# Patient Record
Sex: MALE | Race: BLACK OR AFRICAN AMERICAN | NOT HISPANIC OR LATINO | Employment: UNEMPLOYED | URBAN - METROPOLITAN AREA
[De-identification: names, ages, dates, MRNs, and addresses within clinical notes are randomized per-mention and may not be internally consistent; named-entity substitution may affect disease eponyms.]

---

## 2018-08-13 ENCOUNTER — HOSPITAL ENCOUNTER (OUTPATIENT)
Facility: MEDICAL CENTER | Age: 68
End: 2018-08-13
Attending: FAMILY MEDICINE
Payer: MEDICARE

## 2018-08-13 ENCOUNTER — OFFICE VISIT (OUTPATIENT)
Dept: MEDICAL GROUP | Facility: PHYSICIAN GROUP | Age: 68
End: 2018-08-13
Payer: MEDICARE

## 2018-08-13 VITALS
HEIGHT: 68 IN | TEMPERATURE: 98 F | WEIGHT: 206.9 LBS | DIASTOLIC BLOOD PRESSURE: 100 MMHG | SYSTOLIC BLOOD PRESSURE: 162 MMHG | OXYGEN SATURATION: 98 % | RESPIRATION RATE: 12 BRPM | BODY MASS INDEX: 31.36 KG/M2 | HEART RATE: 80 BPM

## 2018-08-13 DIAGNOSIS — R25.1 TREMOR OF RIGHT HAND: ICD-10-CM

## 2018-08-13 DIAGNOSIS — D51.8 OTHER VITAMIN B12 DEFICIENCY ANEMIA: ICD-10-CM

## 2018-08-13 DIAGNOSIS — E11.9 TYPE 2 DIABETES MELLITUS WITHOUT COMPLICATION, WITHOUT LONG-TERM CURRENT USE OF INSULIN (HCC): ICD-10-CM

## 2018-08-13 DIAGNOSIS — Z23 IMMUNIZATION DUE: ICD-10-CM

## 2018-08-13 DIAGNOSIS — Z12.11 SCREENING FOR COLON CANCER: ICD-10-CM

## 2018-08-13 DIAGNOSIS — R35.1 NOCTURIA: ICD-10-CM

## 2018-08-13 DIAGNOSIS — Z12.5 ENCOUNTER FOR SCREENING FOR MALIGNANT NEOPLASM OF PROSTATE: ICD-10-CM

## 2018-08-13 DIAGNOSIS — E53.8 VITAMIN B12 DEFICIENCY: ICD-10-CM

## 2018-08-13 DIAGNOSIS — I10 ESSENTIAL HYPERTENSION: ICD-10-CM

## 2018-08-13 DIAGNOSIS — Z11.59 NEED FOR HEPATITIS C SCREENING TEST: ICD-10-CM

## 2018-08-13 PROBLEM — D51.9 ANEMIA DUE TO VITAMIN B12 DEFICIENCY: Status: ACTIVE | Noted: 2018-08-13

## 2018-08-13 PROCEDURE — 99204 OFFICE O/P NEW MOD 45 MIN: CPT | Performed by: FAMILY MEDICINE

## 2018-08-13 PROCEDURE — 82274 ASSAY TEST FOR BLOOD FECAL: CPT

## 2018-08-13 RX ORDER — MULTIVITAMIN WITH IRON
50 TABLET ORAL DAILY
COMMUNITY
End: 2018-09-10

## 2018-08-13 RX ORDER — AMLODIPINE BESYLATE 10 MG/1
10 TABLET ORAL DAILY
COMMUNITY
End: 2018-10-03 | Stop reason: SDUPTHER

## 2018-08-13 RX ORDER — ATENOLOL 50 MG/1
50 TABLET ORAL 2 TIMES DAILY
COMMUNITY
End: 2018-10-03 | Stop reason: SDUPTHER

## 2018-08-13 RX ORDER — LISINOPRIL 40 MG/1
40 TABLET ORAL DAILY
COMMUNITY
End: 2018-10-03 | Stop reason: SDUPTHER

## 2018-08-13 RX ORDER — GLUCOSAMINE SULFATE 500 MG
1 CAPSULE ORAL DAILY
COMMUNITY

## 2018-08-13 ASSESSMENT — PATIENT HEALTH QUESTIONNAIRE - PHQ9: CLINICAL INTERPRETATION OF PHQ2 SCORE: 0

## 2018-08-13 NOTE — ASSESSMENT & PLAN NOTE
He notes a tremor of his right hand when he is writing or holding something.  The tremor does not occur at rest.  He denies numbness in his hand.

## 2018-08-13 NOTE — ASSESSMENT & PLAN NOTE
He is taking atenolol, lisinopril and amlodipine for hypertension.  He denies a history of heart disease.  He does admit that he does not take atenolol or lisinopril regularly as he does not like them.  He has not taken these two for 2 days.

## 2018-08-13 NOTE — ASSESSMENT & PLAN NOTE
He reports frequent urination at night and during the day.  He describes some leaking and a weak stream.  He reports this has been a problem for 15 years.

## 2018-08-13 NOTE — ASSESSMENT & PLAN NOTE
He was taking monthly B12 shots until about 3 weeks ago.  He is taking vitamin B12 orally daily now.

## 2018-08-14 NOTE — ASSESSMENT & PLAN NOTE
He is taking metformin 850 mg twice a day.  His Hga1c today is 7.8.  He has lost some weight since he was released from a correctional facility.

## 2018-08-14 NOTE — PROGRESS NOTES
CC:  Hand tremor, anemia, urinary symptoms    HISTORY OF THE PRESENT ILLNESS: Patient is a 67 y.o. male. This pleasant patient is here today to discuss the following    Health Maintenance: reviewed, labs ordered, needs retinal/tdap      Tremor of right hand  He notes a tremor of his right hand when he is writing or holding something.  The tremor does not occur at rest.  He denies numbness in his hand.      Nocturia  He reports frequent urination at night and during the day.  He describes some leaking and a weak stream.  He reports this has been a problem for 15 years.      Essential hypertension  He is taking atenolol, lisinopril and amlodipine for hypertension.  He denies a history of heart disease.    Vitamin B12 deficiency  He was taking monthly B12 shots until about 3 weeks ago.  He is taking vitamin B12 orally daily now.      Anemia due to vitamin B12 deficiency  He has a history of anemia that lead to fainting.      Type 2 diabetes mellitus (HCC)  He is taking metformin 850 mg twice a day.  His Hga1c today is 7.8.  He has lost some weight since he was released from a correctional facility.      Allergies: Patient has no known allergies.    Current Outpatient Prescriptions Ordered in [facility]   Medication Sig Dispense Refill   • glucosamine 500 MG Cap Take 1 Cap by mouth every day.     • lisinopril (PRINIVIL, ZESTRIL) 40 MG tablet Take 40 mg by mouth every day.     • metFORMIN (GLUCOPHAGE) 850 MG Tab Take 850 mg by mouth 2 times a day, with meals.     • Calcium (OYSTER-BLADE 500 PO) Take 1 Tab by mouth 2 Times a Day.     • amLODIPine (NORVASC) 10 MG Tab Take 10 mg by mouth every day.     • vitamin b-12 (CYANOCOBALAMIN) 50 MCG tablet Take 50 mcg by mouth every day.     • multivitamin (THERAGRAN) Tab Take 1 Tab by mouth every day.     • BABY ASPIRIN PO Take 81 mg by mouth every day.     • atenolol (TENORMIN) 50 MG Tab Take 50 mg by mouth 2 times a day.       No current [facility]-ordered facility-administered medications on  "file.        History reviewed. No pertinent past medical history.    History reviewed. No pertinent surgical history.    Social History   Substance Use Topics   • Smoking status: Former Smoker     Packs/day: 0.50     Years: 5.00     Types: Cigarettes     Quit date: 1/1/1978   • Smokeless tobacco: Never Used   • Alcohol use No       Social History     Social History Narrative    Released from [facility] in 7/2018, nonviolent offense, volunteering        Family History   Problem Relation Age of Onset   • Stroke Mother         aneurysm   • Diabetes Sister    • Asthma Brother        ROS:     - Constitutional: Negative for fever, chills, unexpected weight change, and fatigue/generalized weakness.     - HEENT: Negative for headaches, vision changes, hearing changes, ear pain, ear discharge, rhinorrhea, sinus congestion, sore throat, and neck pain.      - Respiratory: Negative for cough, sputum production, chest congestion, dyspnea, wheezing, and crackles.      - Cardiovascular: Negative for chest pain, palpitations, orthopnea, and bilateral lower extremity edema.     - Gastrointestinal: Negative for heartburn, nausea, vomiting, abdominal pain, hematochezia, melena, diarrhea, constipation, and greasy/foul-smelling stools.     - Genitourinary: Negative for dysuria, polyuria, hematuria, pyuria, urinary urgency, and urinary incontinence.    - Musculoskeletal: Positive for low back pain and knee pain.     - Skin: Negative for rash, itching, cyanotic skin color change.     - Neurological: Negative for dizziness, tingling, tremors, focal sensory deficit, focal weakness and headaches.     - Endo/Heme/Allergies: Does not bruise/bleed easily.     - Psychiatric/Behavioral: Negative for depression, suicidal/homicidal ideation and memory loss.        Exam: Blood pressure (!) 162/100, pulse 80, temperature 36.7 °C (98 °F), resp. rate 12, height 1.727 m (5' 8\"), weight 93.8 kg (206 lb 14.4 oz), SpO2 98 %. Body mass index is 31.46 " kg/m².    General: Normal appearing. No distress.  HEENT: Normocephalic. Eyes conjunctiva clear lids without ptosis, pupils equal and reactive to light accommodation, ears normal shape and contour, canals are clear bilaterally, tympanic membranes are benign, nasal mucosa benign, oropharynx is without erythema, edema or exudates.   Neck: Supple without JVD or bruit. Thyroid is not enlarged.  Pulmonary: Clear to ausculation.  Normal effort. No rales, ronchi, or wheezing.  Cardiovascular: Regular rate and rhythm without murmur. Carotid and radial pulses are intact and equal bilaterally.  Abdomen: Soft, nontender, nondistended. Normal bowel sounds. Liver and spleen are not palpable  Neurologic: Grossly nonfocal  Lymph: No cervical, supraclavicular or axillary lymph nodes are palpable  Skin: Warm and dry.  No obvious lesions.  Musculoskeletal: Normal gait. No extremity cyanosis, clubbing, or edema.  Psych: Normal mood and affect. Alert and oriented x3. Judgment and insight is normal.          Assessment/Plan  1. Immunization due  Will need tetanus,  Discussed the need, he will look into having it done with the pharmacy or health center for cost reasons.    2. Tremor of right hand  This is a new intention tremor without other concerning signs of central lesion.  This is unlikely to be parkinson's as it is an intention tremor. We will evaluate for metabolic abnormalities.  Consider neurology referral if worsening.  - TSH; Future    3. Nocturia  Suspect BPH, infection less likely.  We will evaluate for evidence of cancer.  If infection testing is negative consider treatment for BPH.  - PROSTATE SPECIFIC AG SCREENING; Future  - URINALYSIS; Future  - URINE CULTURE(NEW); Future    4. Essential hypertension  This is uncontrolled today but he has not been taking his medications for a few days.  He agrees to take his medications daily, we will reassess at his next visit.    5. Type 2 diabetes mellitus without complication,  without long-term current use of insulin (HCC)  HgA1c above goal but not excessively.  He is planning to lose weight and this may improve.  We will update routine labs and continue to follow HgA1c.  - MICROALBUMIN CREAT RATIO URINE; Future  - LIPID PROFILE; Future  - COMP METABOLIC PANEL; Future    6. Vitamin B12 deficiency  We will reassess levels on oral therapy.  If needed, resume injections.  - VITAMIN B12; Future    7. Other vitamin B12 deficiency anemia  He reports a history of anemia, this may be iron deficiency or B12 deficiency or both.  We will evaluate a CBC to see if iron is needed.  - CBC WITH DIFFERENTIAL; Future    8. Screening for colon cancer  The indications for colon cancer screening were discussed based on his age were discussed.  As he is at average risk the options for colon cancer screening include colonoscopy, annual FIT testing and sigmoidoscopy.  The risks and benefits of the different options were discussed.  He elects to proceed with FIT testing which was ordered.      - OCCULT BLOOD FECES IMMUNOASSAY (FIT); Future    9. Encounter for screening for malignant neoplasm of prostate  Prostate cancer screening is indicated given his increased risk.   - PROSTATE SPECIFIC AG SCREENING; Future    10. Need for hepatitis C screening test  Routine Hep C Screening ordered.  - HEP C VIRUS ANTIBODY; Future

## 2018-08-19 DIAGNOSIS — Z12.11 SCREENING FOR COLON CANCER: ICD-10-CM

## 2018-08-20 ENCOUNTER — HOSPITAL ENCOUNTER (OUTPATIENT)
Dept: LAB | Facility: MEDICAL CENTER | Age: 68
End: 2018-08-20
Attending: FAMILY MEDICINE
Payer: MEDICARE

## 2018-08-20 DIAGNOSIS — E53.8 VITAMIN B12 DEFICIENCY: ICD-10-CM

## 2018-08-20 DIAGNOSIS — R25.1 TREMOR OF RIGHT HAND: ICD-10-CM

## 2018-08-20 DIAGNOSIS — Z12.5 ENCOUNTER FOR SCREENING FOR MALIGNANT NEOPLASM OF PROSTATE: ICD-10-CM

## 2018-08-20 DIAGNOSIS — Z11.59 NEED FOR HEPATITIS C SCREENING TEST: ICD-10-CM

## 2018-08-20 DIAGNOSIS — D51.8 OTHER VITAMIN B12 DEFICIENCY ANEMIA: ICD-10-CM

## 2018-08-20 DIAGNOSIS — E11.9 TYPE 2 DIABETES MELLITUS WITHOUT COMPLICATION, WITHOUT LONG-TERM CURRENT USE OF INSULIN (HCC): ICD-10-CM

## 2018-08-20 DIAGNOSIS — R35.1 NOCTURIA: ICD-10-CM

## 2018-08-20 LAB
ALBUMIN SERPL BCP-MCNC: 4.4 G/DL (ref 3.2–4.9)
ALBUMIN/GLOB SERPL: 1 G/DL
ALP SERPL-CCNC: 59 U/L (ref 30–99)
ALT SERPL-CCNC: 58 U/L (ref 2–50)
ANION GAP SERPL CALC-SCNC: 11 MMOL/L (ref 0–11.9)
APPEARANCE UR: CLEAR
AST SERPL-CCNC: 50 U/L (ref 12–45)
BASOPHILS # BLD AUTO: 0.9 % (ref 0–1.8)
BASOPHILS # BLD: 0.06 K/UL (ref 0–0.12)
BILIRUB SERPL-MCNC: 0.7 MG/DL (ref 0.1–1.5)
BILIRUB UR QL STRIP.AUTO: ABNORMAL
BUN SERPL-MCNC: 22 MG/DL (ref 8–22)
CALCIUM SERPL-MCNC: 10.1 MG/DL (ref 8.5–10.5)
CHLORIDE SERPL-SCNC: 105 MMOL/L (ref 96–112)
CHOLEST SERPL-MCNC: 189 MG/DL (ref 100–199)
CO2 SERPL-SCNC: 24 MMOL/L (ref 20–33)
COLOR UR: ABNORMAL
CREAT SERPL-MCNC: 1.45 MG/DL (ref 0.5–1.4)
CREAT UR-MCNC: 372 MG/DL
EOSINOPHIL # BLD AUTO: 0.12 K/UL (ref 0–0.51)
EOSINOPHIL NFR BLD: 1.9 % (ref 0–6.9)
ERYTHROCYTE [DISTWIDTH] IN BLOOD BY AUTOMATED COUNT: 40.7 FL (ref 35.9–50)
EST. AVERAGE GLUCOSE BLD GHB EST-MCNC: 189 MG/DL
GLOBULIN SER CALC-MCNC: 4.2 G/DL (ref 1.9–3.5)
GLUCOSE SERPL-MCNC: 153 MG/DL (ref 65–99)
GLUCOSE UR STRIP.AUTO-MCNC: NEGATIVE MG/DL
HBA1C MFR BLD: 8.2 % (ref 0–5.6)
HCT VFR BLD AUTO: 41.5 % (ref 42–52)
HCV AB SER QL: REACTIVE
HDLC SERPL-MCNC: 38 MG/DL
HEMOCCULT STL QL IA: NEGATIVE
HGB BLD-MCNC: 13.7 G/DL (ref 14–18)
IMM GRANULOCYTES # BLD AUTO: 0.01 K/UL (ref 0–0.11)
IMM GRANULOCYTES NFR BLD AUTO: 0.2 % (ref 0–0.9)
KETONES UR STRIP.AUTO-MCNC: NEGATIVE MG/DL
LDLC SERPL CALC-MCNC: 122 MG/DL
LEUKOCYTE ESTERASE UR QL STRIP.AUTO: NEGATIVE
LYMPHOCYTES # BLD AUTO: 1.87 K/UL (ref 1–4.8)
LYMPHOCYTES NFR BLD: 29.4 % (ref 22–41)
MCH RBC QN AUTO: 27.1 PG (ref 27–33)
MCHC RBC AUTO-ENTMCNC: 33 G/DL (ref 33.7–35.3)
MCV RBC AUTO: 82 FL (ref 81.4–97.8)
MICRO URNS: ABNORMAL
MICROALBUMIN UR-MCNC: 5.9 MG/DL
MICROALBUMIN/CREAT UR: 16 MG/G (ref 0–30)
MONOCYTES # BLD AUTO: 0.85 K/UL (ref 0–0.85)
MONOCYTES NFR BLD AUTO: 13.3 % (ref 0–13.4)
NEUTROPHILS # BLD AUTO: 3.46 K/UL (ref 1.82–7.42)
NEUTROPHILS NFR BLD: 54.3 % (ref 44–72)
NITRITE UR QL STRIP.AUTO: NEGATIVE
NRBC # BLD AUTO: 0 K/UL
NRBC BLD-RTO: 0 /100 WBC
PH UR STRIP.AUTO: 5.5 [PH]
PLATELET # BLD AUTO: 220 K/UL (ref 164–446)
PMV BLD AUTO: 12.1 FL (ref 9–12.9)
POTASSIUM SERPL-SCNC: 4.5 MMOL/L (ref 3.6–5.5)
PROT SERPL-MCNC: 8.6 G/DL (ref 6–8.2)
PROT UR QL STRIP: NEGATIVE MG/DL
PSA SERPL-MCNC: 0.68 NG/ML (ref 0–4)
RBC # BLD AUTO: 5.06 M/UL (ref 4.7–6.1)
RBC UR QL AUTO: NEGATIVE
SODIUM SERPL-SCNC: 140 MMOL/L (ref 135–145)
SP GR UR STRIP.AUTO: 1.02
TRIGL SERPL-MCNC: 147 MG/DL (ref 0–149)
TSH SERPL DL<=0.005 MIU/L-ACNC: 3.39 UIU/ML (ref 0.38–5.33)
UROBILINOGEN UR STRIP.AUTO-MCNC: 1 MG/DL
VIT B12 SERPL-MCNC: 283 PG/ML (ref 211–911)
WBC # BLD AUTO: 6.4 K/UL (ref 4.8–10.8)

## 2018-08-20 PROCEDURE — 80053 COMPREHEN METABOLIC PANEL: CPT

## 2018-08-20 PROCEDURE — 86803 HEPATITIS C AB TEST: CPT

## 2018-08-20 PROCEDURE — 36415 COLL VENOUS BLD VENIPUNCTURE: CPT | Mod: GA

## 2018-08-20 PROCEDURE — 81003 URINALYSIS AUTO W/O SCOPE: CPT

## 2018-08-20 PROCEDURE — 84153 ASSAY OF PSA TOTAL: CPT | Mod: GA

## 2018-08-20 PROCEDURE — 85025 COMPLETE CBC W/AUTO DIFF WBC: CPT

## 2018-08-20 PROCEDURE — 84443 ASSAY THYROID STIM HORMONE: CPT

## 2018-08-20 PROCEDURE — 82570 ASSAY OF URINE CREATININE: CPT

## 2018-08-20 PROCEDURE — 82043 UR ALBUMIN QUANTITATIVE: CPT

## 2018-08-20 PROCEDURE — 82607 VITAMIN B-12: CPT

## 2018-08-20 PROCEDURE — 87086 URINE CULTURE/COLONY COUNT: CPT

## 2018-08-20 PROCEDURE — 80061 LIPID PANEL: CPT

## 2018-08-20 PROCEDURE — 83036 HEMOGLOBIN GLYCOSYLATED A1C: CPT | Mod: GA

## 2018-08-22 LAB
BACTERIA UR CULT: NORMAL
SIGNIFICANT IND 70042: NORMAL
SITE SITE: NORMAL
SOURCE SOURCE: NORMAL

## 2018-09-10 ENCOUNTER — OFFICE VISIT (OUTPATIENT)
Dept: MEDICAL GROUP | Facility: PHYSICIAN GROUP | Age: 68
End: 2018-09-10
Payer: MEDICARE

## 2018-09-10 VITALS
BODY MASS INDEX: 30.99 KG/M2 | HEART RATE: 75 BPM | HEIGHT: 68 IN | WEIGHT: 204.5 LBS | DIASTOLIC BLOOD PRESSURE: 92 MMHG | RESPIRATION RATE: 18 BRPM | SYSTOLIC BLOOD PRESSURE: 152 MMHG | OXYGEN SATURATION: 99 % | TEMPERATURE: 98.3 F

## 2018-09-10 DIAGNOSIS — D51.8 OTHER VITAMIN B12 DEFICIENCY ANEMIA: ICD-10-CM

## 2018-09-10 DIAGNOSIS — B17.10 ACUTE HEPATITIS C VIRUS INFECTION WITHOUT HEPATIC COMA: ICD-10-CM

## 2018-09-10 DIAGNOSIS — B18.2 CHRONIC HEPATITIS C WITHOUT HEPATIC COMA (HCC): ICD-10-CM

## 2018-09-10 DIAGNOSIS — I10 ESSENTIAL HYPERTENSION: ICD-10-CM

## 2018-09-10 DIAGNOSIS — Z23 NEED FOR VACCINATION: ICD-10-CM

## 2018-09-10 DIAGNOSIS — M54.50 CHRONIC MIDLINE LOW BACK PAIN WITHOUT SCIATICA: ICD-10-CM

## 2018-09-10 DIAGNOSIS — E11.9 TYPE 2 DIABETES MELLITUS WITHOUT COMPLICATION, WITHOUT LONG-TERM CURRENT USE OF INSULIN (HCC): ICD-10-CM

## 2018-09-10 DIAGNOSIS — E53.8 VITAMIN B12 DEFICIENCY: ICD-10-CM

## 2018-09-10 DIAGNOSIS — R35.1 NOCTURIA: ICD-10-CM

## 2018-09-10 DIAGNOSIS — G89.29 CHRONIC MIDLINE LOW BACK PAIN WITHOUT SCIATICA: ICD-10-CM

## 2018-09-10 PROCEDURE — G0008 ADMIN INFLUENZA VIRUS VAC: HCPCS | Performed by: FAMILY MEDICINE

## 2018-09-10 PROCEDURE — 99214 OFFICE O/P EST MOD 30 MIN: CPT | Mod: 25 | Performed by: FAMILY MEDICINE

## 2018-09-10 PROCEDURE — 90662 IIV NO PRSV INCREASED AG IM: CPT | Performed by: FAMILY MEDICINE

## 2018-09-10 RX ORDER — HYDROCHLOROTHIAZIDE 25 MG/1
25 TABLET ORAL DAILY
Qty: 90 TAB | Refills: 0 | Status: SHIPPED | OUTPATIENT
Start: 2018-09-10 | End: 2019-05-23 | Stop reason: SDUPTHER

## 2018-09-10 RX ORDER — CYANOCOBALAMIN 1000 UG/ML
1000 INJECTION, SOLUTION INTRAMUSCULAR; SUBCUTANEOUS ONCE
Status: COMPLETED | OUTPATIENT
Start: 2018-09-10 | End: 2018-09-10

## 2018-09-10 RX ORDER — TAMSULOSIN HYDROCHLORIDE 0.4 MG/1
0.4 CAPSULE ORAL
Qty: 30 CAP | Refills: 0 | Status: SHIPPED | OUTPATIENT
Start: 2018-09-10 | End: 2018-12-12 | Stop reason: SDUPTHER

## 2018-09-10 RX ADMIN — CYANOCOBALAMIN 1000 MCG: 1000 INJECTION, SOLUTION INTRAMUSCULAR; SUBCUTANEOUS at 11:55

## 2018-09-10 NOTE — ASSESSMENT & PLAN NOTE
His B12 levels are borderling low at < 300.  He was previously treated with monthly injections.  He does not know how to administer these himself.

## 2018-09-10 NOTE — PATIENT INSTRUCTIONS
Your vitamin B12 is low, we will restart mo  nthly B12 injections.  You can have those given in this office with an MA visit.  Mondays and Wednesdays are best because I will be in the office those days.    Please take the metformin twice a day    Have your labs done either today or in the next few weeks, and your ultrasound, and I will see you in 3-4 weeks.    Back Injury Prevention  Back injuries can be very painful. They can also be difficult to heal. After having one back injury, you are more likely to injure your back again. It is important to learn how to avoid injuring or re-injuring your back. The following tips can help you to prevent a back injury.  WHAT SHOULD I KNOW ABOUT PHYSICAL FITNESS?  · Exercise for 30 minutes per day on most days of the week or as directed by your health care provider. Make sure to:  ¨ Do aerobic exercises, such as walking, jogging, biking, or swimming.  ¨ Do exercises that increase balance and strength, such as amber chi and yoga. These can decrease your risk of falling and injuring your back.  ¨ Do stretching exercises to help with flexibility.  ¨ Try to develop strong abdominal muscles. Your abdominal muscles provide a lot of the support that is needed by your back.  · Maintain a healthy weight.  This helps to decrease your risk of a back injury.  WHAT SHOULD I KNOW ABOUT MY DIET?  · Talk with your health care provider about your overall diet. Take supplements and vitamins only as directed by your health care provider.  · Talk with your health care provider about how much calcium and vitamin D you need each day. These nutrients help to prevent weakening of the bones (osteoporosis). Osteoporosis can cause broken (fractured) bones, which lead to back pain.  · Include good sources of calcium in your diet, such as dairy products, green leafy vegetables, and products that have had calcium added to them (fortified).  · Include good sources of vitamin D in your diet, such as milk and  "foods that are fortified with vitamin D.  WHAT SHOULD I KNOW ABOUT MY POSTURE?  · Sit up straight and stand up straight. Avoid leaning forward when you sit or hunching over when you stand.  · Choose chairs that have good low-back (lumbar) support.  · If you work at a desk, sit close to it so you do not need to lean over. Keep your chin tucked in. Keep your neck drawn back, and keep your elbows bent at a right angle. Your arms should look like the letter \"L.\"  · Sit high and close to the steering wheel when you drive. Add a lumbar support to your car seat, if needed.  · Avoid sitting or standing in one position for very long. Take breaks to get up, stretch, and walk around at least one time every hour. Take breaks every hour if you are driving for long periods of time.  · Sleep on your side with your knees slightly bent, or sleep on your back with a pillow under your knees. Do not lie on the front of your body to sleep.  WHAT SHOULD I KNOW ABOUT LIFTING, TWISTING, AND REACHING?  Lifting and Heavy Lifting  · Avoid heavy lifting, especially repetitive heavy lifting. If you must do heavy lifting:  ¨ Stretch before lifting.  ¨ Work slowly.  ¨ Rest between lifts.  ¨ Use a tool such as a cart or a luther to move objects if one is available.  ¨ Make several small trips instead of carrying one heavy load.  ¨ Ask for help when you need it, especially when moving big objects.  · Follow these steps when lifting:  ¨ Stand with your feet shoulder-width apart.  ¨ Get as close to the object as you can. Do not try to  a heavy object that is far from your body.  ¨ Use handles or lifting straps if they are available.  ¨ Bend at your knees. Squat down, but keep your heels off the floor.  ¨ Keep your shoulders pulled back, your chin tucked in, and your back straight.  ¨ Lift the object slowly while you tighten the muscles in your legs, abdomen, and buttocks. Keep the object as close to the center of your body as " possible.  · Follow these steps when putting down a heavy load:  ¨ Stand with your feet shoulder-width apart.  ¨ Lower the object slowly while you tighten the muscles in your legs, abdomen, and buttocks. Keep the object as close to the center of your body as possible.  ¨ Keep your shoulders pulled back, your chin tucked in, and your back straight.  ¨ Bend at your knees. Squat down, but keep your heels off the floor.  ¨ Use handles or lifting straps if they are available.  Twisting and Reaching  · Avoid lifting heavy objects above your waist.  · Do not twist at your waist while you are lifting or carrying a load. If you need to turn, move your feet.  · Do not bend over without bending at your knees.  · Avoid reaching over your head, across a table, or for an object on a high surface.  WHAT ARE SOME OTHER TIPS?  · Avoid wet floors and icy ground. Keep sidewalks clear of ice to prevent falls.  · Do not sleep on a mattress that is too soft or too hard.  · Keep items that are used frequently within easy reach.  · Put heavier objects on shelves at waist level, and put lighter objects on lower or higher shelves.  · Find ways to decrease your stress, such as exercise, massage, or relaxation techniques. Stress can build up in your muscles. Tense muscles are more vulnerable to injury.  · Talk with your health care provider if you feel anxious or depressed. These conditions can make back pain worse.  · Wear flat heel shoes with cushioned soles.  · Avoid sudden movements.  · Use both shoulder straps when carrying a backpack.  · Do not use any tobacco products, including cigarettes, chewing tobacco, or electronic cigarettes. If you need help quitting, ask your health care provider.     This information is not intended to replace advice given to you by your health care provider. Make sure you discuss any questions you have with your health care provider.     Document Released: 01/25/2006 Document Revised: 05/03/2016 Document  "Reviewed: 12/22/2015  [employer] Interactive Patient Education ©2016 [employer]      Low Back Sprain Rehab  Ask your health care provider which exercises are safe for you. Do exercises exactly as told by your health care provider and adjust them as directed. It is normal to feel mild stretching, pulling, tightness, or discomfort as you do these exercises, but you should stop right away if you feel sudden pain or your pain gets worse. Do not begin these exercises until told by your health care provider.  Stretching and range of motion exercises  These exercises warm up your muscles and joints and improve the movement and flexibility of your back. These exercises also help to relieve pain, numbness, and tingling.  Exercise A: Lumbar rotation  1. Lie on your back on a firm surface and bend your knees.  2. Straighten your arms out to your sides so each arm forms an \"L\" shape with a side of your body (a 90 degree angle).  3. Slowly move both of your knees to one side of your body until you feel a stretch in your lower back. Try not to let your shoulders move off of the floor.  4. Hold for __________ seconds.  5. Tense your abdominal muscles and slowly move your knees back to the starting position.  6. Repeat this exercise on the other side of your body.  Repeat __________ times. Complete this exercise __________ times a day.  Exercise B: Prone extension on elbows  1. Lie on your abdomen on a firm surface.  2. Prop yourself up on your elbows.  3. Use your arms to help lift your chest up until you feel a gentle stretch in your abdomen and your lower back.  ¨ This will place some of your body weight on your elbows. If this is uncomfortable, try stacking pillows under your chest.  ¨ Your hips should stay down, against the surface that you are lying on. Keep your hip and back muscles relaxed.  4. Hold for __________ seconds.  5. Slowly relax your upper body and return to the starting position.  Repeat __________ times. " Complete this exercise __________ times a day.  Strengthening exercises  These exercises build strength and endurance in your back. Endurance is the ability to use your muscles for a long time, even after they get tired.  Exercise C: Pelvic tilt  1. Lie on your back on a firm surface. Bend your knees and keep your feet flat.  2. Tense your abdominal muscles. Tip your pelvis up toward the ceiling and flatten your lower back into the floor.  ¨ To help with this exercise, you may place a small towel under your lower back and try to push your back into the towel.  3. Hold for __________ seconds.  4. Let your muscles relax completely before you repeat this exercise.  Repeat __________ times. Complete this exercise __________ times a day.  Exercise D: Alternating arm and leg raises  1. Get on your hands and knees on a firm surface. If you are on a hard floor, you may want to use padding to cushion your knees, such as an exercise mat.  2. Line up your arms and legs. Your hands should be below your shoulders, and your knees should be below your hips.  3. Lift your left leg behind you. At the same time, raise your right arm and straighten it in front of you.  ¨ Do not lift your leg higher than your hip.  ¨ Do not lift your arm higher than your shoulder.  ¨ Keep your abdominal and back muscles tight.  ¨ Keep your hips facing the ground.  ¨ Do not arch your back.  ¨ Keep your balance carefully, and do not hold your breath.  4. Hold for __________ seconds.  5. Slowly return to the starting position and repeat with your right leg and your left arm.  Repeat __________ times. Complete this exercise __________ times a day.  Exercise E: Abdominal set with straight leg raise  1. Lie on your back on a firm surface.  2. Bend one of your knees and keep your other leg straight.  3. Tense your abdominal muscles and lift your straight leg up, 4-6 inches (10-15 cm) off the ground.  4. Keep your abdominal muscles tight and hold for  __________ seconds.  ¨ Do not hold your breath.  ¨ Do not arch your back. Keep it flat against the ground.  5. Keep your abdominal muscles tense as you slowly lower your leg back to the starting position.  6. Repeat with your other leg.  Repeat __________ times. Complete this exercise __________ times a day.  Posture and body mechanics     Body mechanics refers to the movements and positions of your body while you do your daily activities. Posture is part of body mechanics. Good posture and healthy body mechanics can help to relieve stress in your body's tissues and joints. Good posture means that your spine is in its natural S-curve position (your spine is neutral), your shoulders are pulled back slightly, and your head is not tipped forward. The following are general guidelines for applying improved posture and body mechanics to your everyday activities.  Standing    · When standing, keep your spine neutral and your feet about hip-width apart. Keep a slight bend in your knees. Your ears, shoulders, and hips should line up.  · When you do a task in which you  one place for a long time, place one foot up on a stable object that is 2-4 inches (5-10 cm) high, such as a footstool. This helps keep your spine neutral.  Sitting  · When sitting, keep your spine neutral and keep your feet flat on the floor. Use a footrest, if necessary, and keep your thighs parallel to the floor. Avoid rounding your shoulders, and avoid tilting your head forward.  · When working at a desk or a computer, keep your desk at a height where your hands are slightly lower than your elbows. Slide your chair under your desk so you are close enough to maintain good posture.  · When working at a computer, place your monitor at a height where you are looking straight ahead and you do not have to tilt your head forward or downward to look at the screen.  Resting    · When lying down and resting, avoid positions that are most painful for  you.  · If you have pain with activities such as sitting, bending, stooping, or squatting (flexion-based activities), lie in a position in which your body does not bend very much. For example, avoid curling up on your side with your arms and knees near your chest (fetal position).  · If you have pain with activities such as standing for a long time or reaching with your arms (extension-based activities), lie with your spine in a neutral position and bend your knees slightly. Try the following positions:  · Lying on your side with a pillow between your knees.  · Lying on your back with a pillow under your knees.  Lifting    · When lifting objects, keep your feet at least shoulder-width apart and tighten your abdominal muscles.  · Bend your knees and hips and keep your spine neutral. It is important to lift using the strength of your legs, not your back. Do not lock your knees straight out.  · Always ask for help to lift heavy or awkward objects.  This information is not intended to replace advice given to you by your health care provider. Make sure you discuss any questions you have with your health care provider.  Document Released: 12/18/2006 Document Revised: 08/24/2017 Document Reviewed: 09/28/2016  [employer] Interactive Patient Education © 2017 [employer]

## 2018-09-10 NOTE — ASSESSMENT & PLAN NOTE
He reports midline low back pain that has been there for years.  He denies a recent injury.  The pain is moderate to severe and worse with activity.  He has a back brace but is not sure if it is helping.

## 2018-09-10 NOTE — PROGRESS NOTES
CC: high blood pressure, abnormal labs, back pain    HISTORY OF PRESENT ILLNESS: Patient is a 67 y.o. male established patient who presents today to discuss the following    Health Maintenance: reviewed    Essential hypertension  His /92 today.  He is taking three medications, atenolol, lisinopril and amlodipine.      Anemia due to vitamin B12 deficiency  He has mild anemia at 13.7 that is normocytic.  He reports being told he had sickle cell trait in the past.  He also has borderline low B12.    Vitamin B12 deficiency  His B12 levels are borderling low at < 300.  He was previously treated with monthly injections.  He does not know how to administer these himself.    Type 2 diabetes mellitus (HCC)  He is taking metformin 850 once a day.  His hemoglobin A1c is 8.2.  He is also working on exercise and diet.    Nocturia  His urinalysis and culture were normal.  He continues to have nocturia, urgency and occasional incontinence.    Chronic midline low back pain without sciatica  He reports midline low back pain that has been there for years.  He denies a recent injury.  The pain is moderate to severe and worse with activity.  He has a back brace but is not sure if it is helping.      Chronic hepatitis C without hepatic coma (HCC)  His hep C screening was positive.  He is not aware of a previous diagnosis of this.      Patient Active Problem List    Diagnosis Date Noted   • Chronic hepatitis C without hepatic coma (HCC) 09/10/2018   • Chronic midline low back pain without sciatica 09/10/2018   • Tremor of right hand 08/13/2018   • Nocturia 08/13/2018   • Essential hypertension 08/13/2018   • Type 2 diabetes mellitus (HCC) 08/13/2018   • Vitamin B12 deficiency 08/13/2018   • Anemia due to vitamin B12 deficiency 08/13/2018      Allergies:Patient has no known allergies.    Current Outpatient Prescriptions   Medication Sig Dispense Refill   • hydroCHLOROthiazide (HYDRODIURIL) 25 MG Tab Take 1 Tab by mouth every day.  90 Tab 0   • tamsulosin (FLOMAX) 0.4 MG capsule Take 1 Cap by mouth ONE-HALF HOUR AFTER BREAKFAST. 30 Cap 0   • glucosamine 500 MG Cap Take 1 Cap by mouth every day.     • lisinopril (PRINIVIL, ZESTRIL) 40 MG tablet Take 40 mg by mouth every day.     • metFORMIN (GLUCOPHAGE) 850 MG Tab Take 850 mg by mouth 2 times a day, with meals.     • Calcium (OYSTER-BLADE 500 PO) Take 1 Tab by mouth 2 Times a Day.     • amLODIPine (NORVASC) 10 MG Tab Take 10 mg by mouth every day.     • multivitamin (THERAGRAN) Tab Take 1 Tab by mouth every day.     • BABY ASPIRIN PO Take 81 mg by mouth every day.     • atenolol (TENORMIN) 50 MG Tab Take 50 mg by mouth 2 times a day.       No current facility-administered medications for this visit.        Social History   Substance Use Topics   • Smoking status: Former Smoker     Packs/day: 0.50     Years: 5.00     Types: Cigarettes     Quit date: 1/1/1978   • Smokeless tobacco: Never Used   • Alcohol use No     Social History     Social History Narrative    Released from custodial in 7/2018, nonviolent offense, volunteering        Family History   Problem Relation Age of Onset   • Stroke Mother         aneurysm   • Diabetes Sister    • Asthma Brother        Review of Systems:      - Constitutional: Negative for fever, chills, unexpected weight change, and fatigue/generalized weakness.     - Respiratory: Negative for cough, sputum production, chest congestion, dyspnea, wheezing, and crackles.      - Cardiovascular: Negative for chest pain, palpitations, orthopnea, and bilateral lower extremity edema.     - Gastrointestinal: Negative for heartburn, nausea, vomiting, abdominal pain, hematochezia, melena, diarrhea, constipation, and greasy/foul-smelling stools.     - Genitourinary: Negative for dysuria, polyuria, hematuria,     - Musculoskeletal: Negative for myalgias and joint pain.     - Skin: Negative for rash, itching, cyanotic skin color change.     - Neurological: Negative for dizziness,  "tingling, tremors, focal sensory deficit, focal weakness and headaches.     - Endo/Heme/Allergies: Does not bruise/bleed easily.     - Psychiatric/Behavioral: Negative for depression, suicidal/homicidal ideation and memory loss.          Exam:  Blood pressure 152/92, pulse 75, temperature 36.8 °C (98.3 °F), resp. rate 18, height 1.727 m (5' 8\"), weight 92.8 kg (204 lb 8 oz), SpO2 99 %. Body mass index is 31.09 kg/m².    General:  Well nourished, well developed male in NAD  Head is grossly normal.  Neck: Supple without JVD or bruit. Thyroid is not enlarged.  Pulmonary: Clear to ausculation and percussion.  Normal effort. No rales, ronchi, or wheezing.  Cardiovascular: Regular rate and rhythm without murmur. Carotid and radial pulses are intact and equal bilaterally.  Extremities: no clubbing, cyanosis, or edema.  Monofilament testing with a 10 gram force: sensation intact: intact bilaterally  Visual Inspection: Feet without maceration, ulcers, fissures.  Pedal pulses: intact bilaterally    BACK EXAM:    General: alert  Body habitus: mildly obese  Gait: normal  Visible deformity of trunk? no  Tenderness of vertebral spinous processes? no  Tenderness of other back or buttock areas? no    REFLEXES:  Knee jerk (L4): left 2+; right 2+  Ankle jerk (S1): left 2+; right 2+    STRENGTH TESTING:    Quadriceps (L4): left 5/5; right 5/5   Hamstrings (L5 and S1): left 5/5; right 5/5   Ankle dorsiflexion (L4 and L5): left 5/5; right 5/5   Ankle plantarflexion (S1): left 5/5; right 5/5    SENSORY EXAMINATION (intact to light touch?)  Intact to touch throughout    STRAIGHT LEG RAISE TESTING:   Left: raised to 180 degrees; sciatic sx evoked by just raising the leg? no; evoked with passive ankle dorsiflexion? no   Right: raised to 180 degrees; sciatic sx evoked by just raising the leg? no; evoked with passive ankle dorsiflexion? no        Please note that this dictation was created using voice recognition software. I have made every " reasonable attempt to correct obvious errors, but I expect that there are errors of grammar and possibly content that I did not discover before finalizing the note.    Assessment/Plan:  1. Essential hypertension  His BP is not at goal, we will add hctz and repeat labs in 4 weeks.  - hydroCHLOROthiazide (HYDRODIURIL) 25 MG Tab; Take 1 Tab by mouth every day.  Dispense: 90 Tab; Refill: 0    2. Vitamin B12 deficiency  Resume B12 injections.  - cyanocobalamin (VITAMIN B-12) injection 1,000 mcg; 1 mL by Intramuscular route Once.    3. Type 2 diabetes mellitus without complication, without long-term current use of insulin (HCC)  His HgA1c is not at goal.  He is instituting lifestyle change.  We will increase metformin to twice daily.  Foot exam today is normal.  Will need retinal exam     - FERRITIN; Future  - Diabetic Monofilament Lower Extremity Exam    4. Acute hepatitis C virus infection without hepatic coma  Positive Hep C screen, will order follow up testing and an US for cirrhosis or evidence of malignancy.  - HEPATITIS B CORE AB W/REFLEX  - HEP B SURFACE ANTIGEN; Future  - HEPATITIS B SURFACE AB TEST  - HEPATITIS C RNA QUANT.; Future  - US-ABDOMEN LIMITED; Future    5. Nocturia  We will start flomax for suspected BPH, PSA is normal as is urine testing.  Will obtain post void residual due to the severity of symptoms.  - tamsulosin (FLOMAX) 0.4 MG capsule; Take 1 Cap by mouth ONE-HALF HOUR AFTER BREAKFAST.  Dispense: 30 Cap; Refill: 0  - US-RENAL; Future    6. Chronic midline low back pain without sciatica  This suggests mild degenerative changes, exam is negative for myelopathy and concern for infection or malignancy low.  We will order xrays to rule out conerning lesions and evaluate the degree of pain.  Consider PMR if unable to manage symptomatically.  - DX-LUMBAR SPINE-4+ VIEWS; Future    7. Need for vaccination  - INFLUENZA VACCINE, HIGH DOSE (65+ ONLY)    8. Chronic hepatitis C without hepatic coma (HCC)  -  COMP METABOLIC PANEL; Future    9. Other vitamin B12 deficiency anemia  Anemia is likely multifactorial, will need follow up ferritin to rule out iron deficiency.  Will treat B12 and repeat CBC is a few months.  Sickle cell trait may be contributing as well, this is only a mild anemia.

## 2018-09-10 NOTE — ASSESSMENT & PLAN NOTE
He has mild anemia at 13.7 that is normocytic.  He reports being told he had sickle cell trait in the past.  He also has borderline low B12.

## 2018-09-10 NOTE — ASSESSMENT & PLAN NOTE
His urinalysis and culture were normal.  He continues to have nocturia, urgency and occasional incontinence.

## 2018-09-13 ENCOUNTER — HOSPITAL ENCOUNTER (OUTPATIENT)
Dept: LAB | Facility: MEDICAL CENTER | Age: 68
End: 2018-09-13
Attending: FAMILY MEDICINE
Payer: MEDICARE

## 2018-09-13 DIAGNOSIS — I10 ESSENTIAL HYPERTENSION: ICD-10-CM

## 2018-09-13 DIAGNOSIS — E11.9 TYPE 2 DIABETES MELLITUS WITHOUT COMPLICATION, WITHOUT LONG-TERM CURRENT USE OF INSULIN (HCC): ICD-10-CM

## 2018-09-13 DIAGNOSIS — B18.2 CHRONIC HEPATITIS C WITHOUT HEPATIC COMA (HCC): ICD-10-CM

## 2018-09-13 DIAGNOSIS — B17.10 ACUTE HEPATITIS C VIRUS INFECTION WITHOUT HEPATIC COMA: ICD-10-CM

## 2018-09-13 LAB
ALBUMIN SERPL BCP-MCNC: 4.2 G/DL (ref 3.2–4.9)
ALBUMIN/GLOB SERPL: 1.1 G/DL
ALP SERPL-CCNC: 54 U/L (ref 30–99)
ALT SERPL-CCNC: 55 U/L (ref 2–50)
ANION GAP SERPL CALC-SCNC: 8 MMOL/L (ref 0–11.9)
AST SERPL-CCNC: 47 U/L (ref 12–45)
BILIRUB SERPL-MCNC: 0.6 MG/DL (ref 0.1–1.5)
BUN SERPL-MCNC: 20 MG/DL (ref 8–22)
CALCIUM SERPL-MCNC: 9.7 MG/DL (ref 8.5–10.5)
CHLORIDE SERPL-SCNC: 106 MMOL/L (ref 96–112)
CO2 SERPL-SCNC: 24 MMOL/L (ref 20–33)
CREAT SERPL-MCNC: 1.26 MG/DL (ref 0.5–1.4)
FERRITIN SERPL-MCNC: 105.1 NG/ML (ref 22–322)
GLOBULIN SER CALC-MCNC: 3.7 G/DL (ref 1.9–3.5)
GLUCOSE SERPL-MCNC: 148 MG/DL (ref 65–99)
HBV CORE AB SERPL QL IA: NEGATIVE
HBV SURFACE AG SER QL: NEGATIVE
POTASSIUM SERPL-SCNC: 4.6 MMOL/L (ref 3.6–5.5)
PROT SERPL-MCNC: 7.9 G/DL (ref 6–8.2)
SODIUM SERPL-SCNC: 138 MMOL/L (ref 135–145)

## 2018-09-13 PROCEDURE — 87340 HEPATITIS B SURFACE AG IA: CPT

## 2018-09-13 PROCEDURE — 36415 COLL VENOUS BLD VENIPUNCTURE: CPT

## 2018-09-13 PROCEDURE — 82728 ASSAY OF FERRITIN: CPT

## 2018-09-13 PROCEDURE — 86704 HEP B CORE ANTIBODY TOTAL: CPT

## 2018-09-13 PROCEDURE — 80053 COMPREHEN METABOLIC PANEL: CPT

## 2018-09-19 ENCOUNTER — TELEPHONE (OUTPATIENT)
Dept: MEDICAL GROUP | Facility: PHYSICIAN GROUP | Age: 68
End: 2018-09-19

## 2018-09-19 NOTE — TELEPHONE ENCOUNTER
----- Message from Alisia Ribeiro M.D. sent at 9/17/2018 12:21 PM PDT -----  Results show back arthritis that is mild to moderate.  Please let him know that we will discuss this at his next appointment, I wanted to see him in 3-4 weeks from his last appointment on 9/10.  Thanks

## 2018-09-19 NOTE — TELEPHONE ENCOUNTER
Phone Number Called: N/A- advised via [employer]    Message: Patient contacted via [employer] regarding results, results given in an separate encounter.    Left Message for patient to call back: N\A

## 2018-10-01 ENCOUNTER — TELEPHONE (OUTPATIENT)
Dept: MEDICAL GROUP | Facility: PHYSICIAN GROUP | Age: 68
End: 2018-10-01

## 2018-10-01 DIAGNOSIS — B18.2 CHRONIC HEPATITIS C WITHOUT HEPATIC COMA (HCC): ICD-10-CM

## 2018-10-01 DIAGNOSIS — N52.9 ERECTILE DYSFUNCTION, UNSPECIFIED ERECTILE DYSFUNCTION TYPE: ICD-10-CM

## 2018-10-01 NOTE — TELEPHONE ENCOUNTER
1. Caller Name: Chuck Bullard                                           Call Back Number: [phone] (home)         Patient approves a detailed voicemail message: yes    Patient is asking for a prescription for Viagra

## 2018-10-02 RX ORDER — SILDENAFIL 50 MG/1
50 TABLET, FILM COATED ORAL PRN
Qty: 10 TAB | Refills: 1 | Status: SHIPPED | OUTPATIENT
Start: 2018-10-02 | End: 2018-10-09

## 2018-10-03 DIAGNOSIS — I10 ESSENTIAL HYPERTENSION: ICD-10-CM

## 2018-10-03 DIAGNOSIS — E11.9 TYPE 2 DIABETES MELLITUS WITHOUT COMPLICATION, WITHOUT LONG-TERM CURRENT USE OF INSULIN (HCC): ICD-10-CM

## 2018-10-03 RX ORDER — ATENOLOL 50 MG/1
50 TABLET ORAL 2 TIMES DAILY
Qty: 180 TAB | Refills: 0 | Status: SHIPPED | OUTPATIENT
Start: 2018-10-03

## 2018-10-03 RX ORDER — LISINOPRIL 40 MG/1
40 TABLET ORAL DAILY
Qty: 90 TAB | Refills: 1 | Status: SHIPPED | OUTPATIENT
Start: 2018-10-03 | End: 2019-06-03 | Stop reason: SDUPTHER

## 2018-10-03 RX ORDER — AMLODIPINE BESYLATE 10 MG/1
10 TABLET ORAL DAILY
Qty: 90 TAB | Refills: 1 | Status: SHIPPED | OUTPATIENT
Start: 2018-10-03 | End: 2019-06-07 | Stop reason: SDUPTHER

## 2018-10-03 NOTE — TELEPHONE ENCOUNTER
Prescription for viagra signed, please let him know that he can use 1 tabs 30 min to 4 hour prior to expected intercourse.  I have ordered a follow up hepatitis lab I would like him to have done along with the abdominal ultrasound in the next few weeks.  Thanks

## 2018-10-03 NOTE — TELEPHONE ENCOUNTER
Was the patient seen in the last year in this department? Yes    Does patient have an active prescription for medications requested? Yes    Received Request Via: Patient        Last visit: 9/10/18  Last labs:9/13/18

## 2018-10-05 ENCOUNTER — TELEPHONE (OUTPATIENT)
Dept: MEDICAL GROUP | Facility: PHYSICIAN GROUP | Age: 68
End: 2018-10-05

## 2018-10-05 NOTE — TELEPHONE ENCOUNTER
MEDICATION PRIOR AUTHORIZATION NEEDED:    1. Name of Medication: Vigira    2. Requested By (Name of Pharmacy): Medicare     3. Is insurance on file current? medicare    4. What is the name & phone number of the 3rd party payor? Form complete and faxed waiting for provider signature.

## 2018-10-08 ENCOUNTER — HOSPITAL ENCOUNTER (OUTPATIENT)
Dept: LAB | Facility: MEDICAL CENTER | Age: 68
End: 2018-10-08
Attending: FAMILY MEDICINE
Payer: MEDICARE

## 2018-10-08 DIAGNOSIS — B18.2 CHRONIC HEPATITIS C WITHOUT HEPATIC COMA (HCC): ICD-10-CM

## 2018-10-08 DIAGNOSIS — N52.9 VASCULOGENIC ERECTILE DYSFUNCTION, UNSPECIFIED VASCULOGENIC ERECTILE DYSFUNCTION TYPE: ICD-10-CM

## 2018-10-08 PROCEDURE — 36415 COLL VENOUS BLD VENIPUNCTURE: CPT

## 2018-10-08 PROCEDURE — 87522 HEPATITIS C REVRS TRNSCRPJ: CPT

## 2018-10-08 PROCEDURE — 87902 NFCT AGT GNTYP ALYS HEP C: CPT

## 2018-10-08 RX ORDER — VARDENAFIL HYDROCHLORIDE 5 MG/1
5 TABLET ORAL PRN
Qty: 10 TAB | Refills: 3 | Status: CANCELLED | OUTPATIENT
Start: 2018-10-08

## 2018-10-08 NOTE — TELEPHONE ENCOUNTER
Pt stated that he was prescribed viagra but that it was too expensive. Is there anything else he can be prescribed or the can he get the generic?

## 2018-10-09 PROBLEM — N52.9 VASCULOGENIC ERECTILE DYSFUNCTION: Status: ACTIVE | Noted: 2018-10-09

## 2018-10-09 RX ORDER — SILDENAFIL 50 MG/1
50 TABLET, FILM COATED ORAL PRN
Qty: 6 TAB | Refills: 3 | Status: SHIPPED | OUTPATIENT
Start: 2018-10-09 | End: 2019-01-31

## 2018-10-09 NOTE — TELEPHONE ENCOUNTER
I reordered the medication for 6 tabs and specified generic.  He should ask the pharmacy when there if there is a cheaper brand/drug.  Most of the time these are out of pocket costs for this type of medication.

## 2018-10-09 NOTE — TELEPHONE ENCOUNTER
Spoke to pt and advised of the medication. He is wanting to know if we can prescribe the generic and dispense less medication to see if it is lower in cost.      Patient also requested his imaging go to [facility], that was prescribed in 09/10/18

## 2018-10-10 LAB
HCV QNT BY NAAT INTERP L112599: DETECTED
HCV RNA SERPL NAA+PROBE-ACNC: ABNORMAL IU/ML
HCV RNA SERPL NAA+PROBE-LOG IU: 6.71 LOG IU/ML

## 2018-10-13 LAB — HCV GENTYP SERPL NAA+PROBE: NORMAL

## 2018-10-16 ENCOUNTER — NON-PROVIDER VISIT (OUTPATIENT)
Dept: MEDICAL GROUP | Facility: PHYSICIAN GROUP | Age: 68
End: 2018-10-16
Payer: MEDICAID

## 2018-10-16 DIAGNOSIS — E53.8 VITAMIN B12 DEFICIENCY: ICD-10-CM

## 2018-10-16 RX ORDER — CYANOCOBALAMIN 1000 UG/ML
1000 INJECTION, SOLUTION INTRAMUSCULAR; SUBCUTANEOUS ONCE
Status: COMPLETED | OUTPATIENT
Start: 2018-10-16 | End: 2018-10-16

## 2018-10-16 RX ADMIN — CYANOCOBALAMIN 1000 MCG: 1000 INJECTION, SOLUTION INTRAMUSCULAR; SUBCUTANEOUS at 14:27

## 2018-10-16 NOTE — PROGRESS NOTES
Chuck Bullard is a 67 y.o. male here for a non-provider visit for Vit B12 injection.    Reason for injection: B12 deficiency  Order in MAR?: Yes  Patient supplied?:No  Minimum interval has been met for this injection (per MAR order): No    Order and dose verified by: Dr. Harris  Patient tolerated injection and no adverse effects were observed or reported: Yes    # of Administrations remaining in MAR: 0

## 2018-11-20 ENCOUNTER — NON-PROVIDER VISIT (OUTPATIENT)
Dept: MEDICAL GROUP | Facility: PHYSICIAN GROUP | Age: 68
End: 2018-11-20
Payer: MEDICARE

## 2018-11-20 DIAGNOSIS — R79.89 LOW VITAMIN B12 LEVEL: ICD-10-CM

## 2018-11-26 RX ORDER — CYANOCOBALAMIN 1000 UG/ML
1000 INJECTION, SOLUTION INTRAMUSCULAR; SUBCUTANEOUS ONCE
Status: COMPLETED | OUTPATIENT
Start: 2018-11-26 | End: 2018-11-26

## 2018-11-26 RX ADMIN — CYANOCOBALAMIN 1000 MCG: 1000 INJECTION, SOLUTION INTRAMUSCULAR; SUBCUTANEOUS at 11:18

## 2018-12-03 ENCOUNTER — TELEPHONE (OUTPATIENT)
Dept: MEDICAL GROUP | Facility: PHYSICIAN GROUP | Age: 68
End: 2018-12-03

## 2018-12-03 NOTE — TELEPHONE ENCOUNTER
Patient called and left  regarding his Amlodipine recall. He is wanting to know more information on this, or if he needs to switch medication.

## 2018-12-03 NOTE — TELEPHONE ENCOUNTER
The recall has been for valsartan, not amlodipine.  Amlodipine is only recalled when combined in a pill with valsartan.  Please reassure him that there is not an active recall on amlodipine and he does no need to change his medications.  Please remind him to schedule follow up to discuss his BP and other conditions. CONSUELO moreno

## 2018-12-04 ENCOUNTER — TELEPHONE (OUTPATIENT)
Dept: MEDICAL GROUP | Facility: PHYSICIAN GROUP | Age: 68
End: 2018-12-04

## 2018-12-04 NOTE — TELEPHONE ENCOUNTER
Patient called and left a message here at [facility]. He requests a phone call back at [phone] regarding some problems he his having with [facility] as well as help with scheduling some imagine appointments. Thank you.

## 2018-12-05 ENCOUNTER — TELEPHONE (OUTPATIENT)
Dept: MEDICAL GROUP | Facility: PHYSICIAN GROUP | Age: 68
End: 2018-12-05

## 2018-12-05 NOTE — TELEPHONE ENCOUNTER
Called and LM informing patient if he needs any help he can call back and we can do our best to assist him. Also informed him if he is wanting to schedule an appointment to get some imaging done he will have to contact our imaging department  at [phone].

## 2018-12-05 NOTE — TELEPHONE ENCOUNTER
The patient called in earlier this morning in regards to wanting to schedule appointments for his imagine that he states he had not gotten done yet. He claimed that he wanted to get the imaging done at [facility]. I informed him I would have to re-fax the order to [facility] and then he would have to contact them to get those images scheduled.

## 2018-12-12 ENCOUNTER — OFFICE VISIT (OUTPATIENT)
Dept: MEDICAL GROUP | Facility: PHYSICIAN GROUP | Age: 68
End: 2018-12-12
Payer: MEDICARE

## 2018-12-12 VITALS
SYSTOLIC BLOOD PRESSURE: 142 MMHG | BODY MASS INDEX: 30.31 KG/M2 | HEIGHT: 68 IN | DIASTOLIC BLOOD PRESSURE: 88 MMHG | WEIGHT: 200 LBS | RESPIRATION RATE: 14 BRPM | OXYGEN SATURATION: 95 % | HEART RATE: 71 BPM | TEMPERATURE: 98.8 F

## 2018-12-12 DIAGNOSIS — M54.50 CHRONIC MIDLINE LOW BACK PAIN WITHOUT SCIATICA: ICD-10-CM

## 2018-12-12 DIAGNOSIS — E53.8 VITAMIN B12 DEFICIENCY: ICD-10-CM

## 2018-12-12 DIAGNOSIS — E11.9 TYPE 2 DIABETES MELLITUS WITHOUT COMPLICATION, WITHOUT LONG-TERM CURRENT USE OF INSULIN (HCC): ICD-10-CM

## 2018-12-12 DIAGNOSIS — R35.1 NOCTURIA: ICD-10-CM

## 2018-12-12 DIAGNOSIS — B18.2 CHRONIC HEPATITIS C WITHOUT HEPATIC COMA (HCC): ICD-10-CM

## 2018-12-12 DIAGNOSIS — D51.8 OTHER VITAMIN B12 DEFICIENCY ANEMIA: ICD-10-CM

## 2018-12-12 DIAGNOSIS — I10 ESSENTIAL HYPERTENSION: ICD-10-CM

## 2018-12-12 DIAGNOSIS — G89.29 CHRONIC MIDLINE LOW BACK PAIN WITHOUT SCIATICA: ICD-10-CM

## 2018-12-12 DIAGNOSIS — F22 PARANOIA (HCC): ICD-10-CM

## 2018-12-12 PROCEDURE — 99214 OFFICE O/P EST MOD 30 MIN: CPT | Performed by: FAMILY MEDICINE

## 2018-12-12 RX ORDER — METFORMIN HYDROCHLORIDE 750 MG/1
750 TABLET, EXTENDED RELEASE ORAL DAILY
Qty: 180 TAB | Refills: 0 | Status: SHIPPED | OUTPATIENT
Start: 2018-12-12 | End: 2019-01-10 | Stop reason: SDUPTHER

## 2018-12-12 RX ORDER — TAMSULOSIN HYDROCHLORIDE 0.4 MG/1
0.4 CAPSULE ORAL
Qty: 90 CAP | Refills: 0 | Status: SHIPPED | OUTPATIENT
Start: 2018-12-12

## 2018-12-12 RX ORDER — LIDOCAINE 40 MG/G
1 CREAM TOPICAL ONCE
Qty: 180 G | Refills: 0 | Status: SHIPPED | OUTPATIENT
Start: 2018-12-12 | End: 2018-12-12

## 2018-12-13 ENCOUNTER — TELEPHONE (OUTPATIENT)
Dept: MEDICAL GROUP | Facility: PHYSICIAN GROUP | Age: 68
End: 2018-12-13

## 2018-12-13 DIAGNOSIS — M54.50 CHRONIC MIDLINE LOW BACK PAIN WITHOUT SCIATICA: ICD-10-CM

## 2018-12-13 DIAGNOSIS — G89.29 CHRONIC MIDLINE LOW BACK PAIN WITHOUT SCIATICA: ICD-10-CM

## 2018-12-13 RX ORDER — LIDOCAINE 50 MG/G
1 PATCH TOPICAL EVERY 24 HOURS
Qty: 10 PATCH | Refills: 2 | Status: SHIPPED | OUTPATIENT
Start: 2018-12-13 | End: 2019-01-30

## 2018-12-13 NOTE — ASSESSMENT & PLAN NOTE
He continues to have nocturia.  He ran out of flomax but believes it helped.  He does drink water frequently throughout the day and night.

## 2018-12-13 NOTE — PATIENT INSTRUCTIONS
Motrin or aleve:   Motrin 400 mg three times a day  Aleve up to 500 mg twice a day    Tylenol 1000 mg two times a day

## 2018-12-13 NOTE — ASSESSMENT & PLAN NOTE
Recent labs show a viral load of   Hepatitis C Rna By Pcr, Quanti 5,136,337  IU/mL Final   HCV RNA PCR Qnt Log 6.71  log IU/mL Final     He denies prior IV drug use.  He denies abdominal pain.    A liver US was done recently and was normal

## 2018-12-13 NOTE — ASSESSMENT & PLAN NOTE
Last labs were above goal.  He is taking metformin 850 mg once daily as he forgets the second dose.  He denies symptoms.  Lab Results   Component Value Date/Time    HBA1C 8.2 (H) 08/20/2018 08:58 AM

## 2018-12-13 NOTE — ASSESSMENT & PLAN NOTE
He is on four medications for this and reports he is taking them daily. He denies cardiovascular symptoms.

## 2018-12-13 NOTE — PROGRESS NOTES
CC: abnormal labs, back pain    HISTORY OF PRESENT ILLNESS: Patient is a 67 y.o. male established patient who presents today to follow up the following    Health Maintenance: need vaccine records    Chronic midline low back pain without sciatica  He has requested a back and knee brace for this.  They are supposed to be mailed by today.  He reports midline low back pain that is moderate to severe.  He is not using any medications for this.  The pain sometimes radiates down his legs. He denies weakness of the legs.  He is wondering if he can use CBD or marijuana for this and if he needs a medical marijuana card.    Vitamin B12 deficiency  He continues to get monthly injections.  Last level was < 300.    Type 2 diabetes mellitus (HCC)  Last labs were above goal.  He is taking metformin 850 mg once daily as he forgets the second dose.  He denies symptoms.  Lab Results   Component Value Date/Time    HBA1C 8.2 (H) 08/20/2018 08:58 AM         Paranoia (HCC)  He reports he was diagnosed with paranoia in the past and was on medications but stopped them due to side effects.  He is remaining active and doing well and does not want to resume medications.    Nocturia  He continues to have nocturia.  He ran out of flomax but believes it helped.  He does drink water frequently throughout the day and night.    Essential hypertension  He is on four medications for this and reports he is taking them daily. He denies cardiovascular symptoms.    Chronic hepatitis C without hepatic coma (HCC)  Recent labs show a viral load of   Hepatitis C Rna By Pcr, Quanti 5,136,337  IU/mL Final   HCV RNA PCR Qnt Log 6.71  log IU/mL Final     He denies prior IV drug use.  He denies abdominal pain.    A liver US was done recently and was normal      Patient Active Problem List    Diagnosis Date Noted   • Paranoia (HCC) 12/12/2018   • Vasculogenic erectile dysfunction 10/09/2018   • Chronic hepatitis C without hepatic coma (HCC) 09/10/2018   • Chronic  midline low back pain without sciatica 09/10/2018   • Tremor of right hand 08/13/2018   • Nocturia 08/13/2018   • Essential hypertension 08/13/2018   • Type 2 diabetes mellitus (HCC) 08/13/2018   • Vitamin B12 deficiency 08/13/2018   • Anemia due to vitamin B12 deficiency 08/13/2018      Allergies:Patient has no known allergies.    Current Outpatient Prescriptions   Medication Sig Dispense Refill   • metFORMIN ER (GLUCOPHAGE XR) 750 MG TABLET SR 24 HR Take 1 Tab by mouth every day. 180 Tab 0   • tamsulosin (FLOMAX) 0.4 MG capsule Take 1 Cap by mouth ONE-HALF HOUR AFTER BREAKFAST. 90 Cap 0   • lidocaine (LMX) 4 % Cream Apply 1 Application to affected area(s) Once for 1 dose. 180 g 0   • lisinopril (PRINIVIL, ZESTRIL) 40 MG tablet Take 1 Tab by mouth every day. 90 Tab 1   • atenolol (TENORMIN) 50 MG Tab Take 1 Tab by mouth 2 times a day. 180 Tab 0   • amLODIPine (NORVASC) 10 MG Tab Take 1 Tab by mouth every day. 90 Tab 1   • hydroCHLOROthiazide (HYDRODIURIL) 25 MG Tab Take 1 Tab by mouth every day. 90 Tab 0   • sildenafil citrate (VIAGRA) 50 MG tablet Take 1 Tab by mouth as needed for Erectile Dysfunction. 6 Tab 3   • glucosamine 500 MG Cap Take 1 Cap by mouth every day.     • Calcium (OYSTER-BLAED 500 PO) Take 1 Tab by mouth 2 Times a Day.     • multivitamin (THERAGRAN) Tab Take 1 Tab by mouth every day.     • BABY ASPIRIN PO Take 81 mg by mouth every day.       No current facility-administered medications for this visit.        Social History   Substance Use Topics   • Smoking status: Former Smoker     Packs/day: 0.50     Years: 5.00     Types: Cigarettes     Quit date: 1/1/1978   • Smokeless tobacco: Never Used   • Alcohol use No     Social History     Social History Narrative    Released from prison in 7/2018, nonviolent offense, volunteering        Family History   Problem Relation Age of Onset   • Stroke Mother         aneurysm   • Diabetes Sister    • Asthma Brother        Review of Systems:      -  "Constitutional: Negative for fever, chills, unexpected weight change, and fatigue/generalized weakness.     - Gastrointestinal: Negative for heartburn, nausea, vomiting, abdominal pain, hematochezia, melena, diarrhea, constipation, and greasy/foul-smelling stools.     - Genitourinary: Negative for dysuria, polyuria, hematuria, pyuria, , and urinary incontinence.    - Neurological: Negative for dizziness, tingling, tremors, focal sensory deficit, focal weakness and headaches.     - Psychiatric/Behavioral: Negative for depression, suicidal/homicidal ideation and memory loss.         Exam:    Blood pressure 142/88, pulse 71, temperature 37.1 °C (98.8 °F), resp. rate 14, height 1.727 m (5' 8\"), weight 90.7 kg (200 lb), SpO2 95 %. Body mass index is 30.41 kg/m².    General:  Well nourished, well developed male in NAD  Head is grossly normal.  Neck: Supple without JVD or bruit. Thyroid is not enlarged.  Pulmonary: Clear to ausculation and percussion.  Normal effort. No rales, ronchi, or wheezing.  Cardiovascular: Regular rate and rhythm without murmur. Carotid and radial pulses are intact and equal bilaterally.  Extremities: no clubbing, cyanosis, or edema.    Please note that this dictation was created using voice recognition software. I have made every reasonable attempt to correct obvious errors, but I expect that there are errors of grammar and possibly content that I did not discover before finalizing the note.    Assessment/Plan:  1. Chronic midline low back pain without sciatica  Discussed medications to use for now.  He will try the brace, tylenol and NSAIDs and topical lidocaine for now.  If not improving will refer to physiatry.  We discussed marijuana.  Because it is now legal he should not require a card to be able to use it.  I have advised that he may give it a try, but if controlled substances are needed he will likely need to stop it.  - lidocaine (LMX) 4 % Cream; Apply 1 Application to affected area(s) " Once for 1 dose.  Dispense: 180 g; Refill: 0    2. Type 2 diabetes mellitus without complication, without long-term current use of insulin (HCC)  He needs follow up labs to assess current control.  WE will increase metformin to 1500 mg XR as he does not remember the second dose.  Follow up in 3 months.  - metFORMIN ER (GLUCOPHAGE XR) 750 MG TABLET SR 24 HR; Take 1 Tab by mouth every day.  Dispense: 180 Tab; Refill: 0  - HEMOGLOBIN A1C; Future    3. Nocturia  I suspect BPH, PSA was normal in 8/2018.  Will continue tamsulosin.  Encouraged him to limit evening fluid intakes.  - tamsulosin (FLOMAX) 0.4 MG capsule; Take 1 Cap by mouth ONE-HALF HOUR AFTER BREAKFAST.  Dispense: 90 Cap; Refill: 0    4. Paranoia (HCC)  He is managing his symptoms without medications, continue to monitor.    5. Other vitamin B12 deficiency anemia  Continue injections, will follow up levels.  - VITAMIN B12; Future    6. Chronic hepatitis C without hepatic coma (HCC)  Referral to GI to consider treatment if indicated.  - REFERRAL TO GASTROENTEROLOGY    7. Vitamin B12 deficiency    8. Essential hypertension  This is borderline, goal is < 140 systolic.  He is on four medications.  Will monitor for now.

## 2018-12-13 NOTE — ASSESSMENT & PLAN NOTE
He reports he was diagnosed with paranoia in the past and was on medications but stopped them due to side effects.  He is remaining active and doing well and does not want to resume medications.

## 2018-12-13 NOTE — ASSESSMENT & PLAN NOTE
He has requested a back and knee brace for this.  They are supposed to be mailed by today.  He reports midline low back pain that is moderate to severe.  He is not using any medications for this.  The pain sometimes radiates down his legs. He denies weakness of the legs.  He is wondering if he can use CBD or marijuana for this and if he needs a medical marijuana card.

## 2018-12-21 ENCOUNTER — NON-PROVIDER VISIT (OUTPATIENT)
Dept: MEDICAL GROUP | Facility: PHYSICIAN GROUP | Age: 68
End: 2018-12-21
Payer: MEDICARE

## 2018-12-21 DIAGNOSIS — E53.8 VITAMIN B12 DEFICIENCY: Primary | ICD-10-CM

## 2018-12-21 RX ORDER — CYANOCOBALAMIN 1000 UG/ML
1000 INJECTION, SOLUTION INTRAMUSCULAR; SUBCUTANEOUS ONCE
Status: COMPLETED | OUTPATIENT
Start: 2018-12-21 | End: 2018-12-21

## 2018-12-21 RX ORDER — CYANOCOBALAMIN 1000 UG/ML
1000 INJECTION, SOLUTION INTRAMUSCULAR; SUBCUTANEOUS ONCE
Status: CANCELLED | OUTPATIENT
Start: 2018-12-21 | End: 2018-12-21

## 2018-12-21 RX ADMIN — CYANOCOBALAMIN 1000 MCG: 1000 INJECTION, SOLUTION INTRAMUSCULAR; SUBCUTANEOUS at 11:38

## 2018-12-26 ENCOUNTER — HOSPITAL ENCOUNTER (OUTPATIENT)
Dept: LAB | Facility: MEDICAL CENTER | Age: 68
End: 2018-12-26
Attending: INTERNAL MEDICINE
Payer: MEDICARE

## 2018-12-26 LAB — PLATELET # BLD AUTO: 211 K/UL (ref 164–446)

## 2018-12-26 PROCEDURE — 87522 HEPATITIS C REVRS TRNSCRPJ: CPT

## 2018-12-26 PROCEDURE — 85610 PROTHROMBIN TIME: CPT

## 2018-12-26 PROCEDURE — 36415 COLL VENOUS BLD VENIPUNCTURE: CPT

## 2018-12-26 PROCEDURE — 84520 ASSAY OF UREA NITROGEN: CPT

## 2018-12-26 PROCEDURE — 84460 ALANINE AMINO (ALT) (SGPT): CPT

## 2018-12-26 PROCEDURE — 85049 AUTOMATED PLATELET COUNT: CPT

## 2018-12-26 PROCEDURE — 83883 ASSAY NEPHELOMETRY NOT SPEC: CPT

## 2018-12-26 PROCEDURE — 84450 TRANSFERASE (AST) (SGOT): CPT

## 2018-12-26 PROCEDURE — 82977 ASSAY OF GGT: CPT

## 2018-12-26 PROCEDURE — 87902 NFCT AGT GNTYP ALYS HEP C: CPT

## 2018-12-27 LAB
INR PPP: 1.08 (ref 0.87–1.13)
PROTHROMBIN TIME: 14.1 SEC (ref 12–14.6)

## 2018-12-28 LAB
A2 MACROGLOB SERPL-MCNC: 586 MG/DL (ref 131–293)
ALT SERPL-CCNC: 59 U/L (ref 5–50)
ANNOTATION COMMENT IMP: ABNORMAL
AST SERPL-CCNC: 52 U/L (ref 9–50)
BUN SERPL-SCNC: 14 MG/DL (ref 7–20)
CIRRHOMETER PT SCORE Q4850: 0.8
FIBROSIS STAGE SERPL QL: ABNORMAL
GGT SERPL-CCNC: 103 U/L (ref 7–51)
HCV RNA SERPL NAA+PROBE-ACNC: ABNORMAL IU/ML
HCV RNA SERPL NAA+PROBE-LOG IU: 6.89 LOG IU/ML
HCV RNA SERPL QL NAA+PROBE: DETECTED
INFLAMETER META CLASS Q4853: ABNORMAL
INFLAMETER PT SCORE Q4852: 0.87
LIVER FIBR SCORE SERPL CALC.FIBROMETER: 0.99
PATHOLOGY STUDY: ABNORMAL
PROTHROM ACT/NOR PPP: 73 % (ref 90–120)

## 2018-12-30 LAB — HCV GENTYP SERPL NAA+PROBE: NORMAL

## 2019-01-10 ENCOUNTER — OFFICE VISIT (OUTPATIENT)
Dept: MEDICAL GROUP | Facility: PHYSICIAN GROUP | Age: 69
End: 2019-01-10
Payer: MEDICARE

## 2019-01-10 VITALS
SYSTOLIC BLOOD PRESSURE: 142 MMHG | OXYGEN SATURATION: 96 % | DIASTOLIC BLOOD PRESSURE: 90 MMHG | HEART RATE: 116 BPM | HEIGHT: 68 IN | WEIGHT: 191 LBS | TEMPERATURE: 97.6 F | RESPIRATION RATE: 16 BRPM | BODY MASS INDEX: 28.95 KG/M2

## 2019-01-10 DIAGNOSIS — G89.29 CHRONIC MIDLINE LOW BACK PAIN WITHOUT SCIATICA: ICD-10-CM

## 2019-01-10 DIAGNOSIS — B18.2 CHRONIC HEPATITIS C WITHOUT HEPATIC COMA (HCC): ICD-10-CM

## 2019-01-10 DIAGNOSIS — R35.1 NOCTURIA: ICD-10-CM

## 2019-01-10 DIAGNOSIS — M54.50 CHRONIC MIDLINE LOW BACK PAIN WITHOUT SCIATICA: ICD-10-CM

## 2019-01-10 DIAGNOSIS — Z23 NEED FOR VACCINATION: ICD-10-CM

## 2019-01-10 DIAGNOSIS — E11.9 TYPE 2 DIABETES MELLITUS WITHOUT COMPLICATION, WITHOUT LONG-TERM CURRENT USE OF INSULIN (HCC): ICD-10-CM

## 2019-01-10 DIAGNOSIS — I10 ESSENTIAL HYPERTENSION: ICD-10-CM

## 2019-01-10 DIAGNOSIS — E11.65 UNCONTROLLED TYPE 2 DIABETES MELLITUS WITH HYPERGLYCEMIA (HCC): ICD-10-CM

## 2019-01-10 LAB
HBA1C MFR BLD: 11.5 % (ref ?–5.8)
INT CON NEG: NORMAL
INT CON POS: NORMAL

## 2019-01-10 PROCEDURE — 90472 IMMUNIZATION ADMIN EACH ADD: CPT | Performed by: FAMILY MEDICINE

## 2019-01-10 PROCEDURE — 90715 TDAP VACCINE 7 YRS/> IM: CPT | Performed by: FAMILY MEDICINE

## 2019-01-10 PROCEDURE — 83036 HEMOGLOBIN GLYCOSYLATED A1C: CPT | Performed by: FAMILY MEDICINE

## 2019-01-10 PROCEDURE — 90670 PCV13 VACCINE IM: CPT | Performed by: FAMILY MEDICINE

## 2019-01-10 PROCEDURE — 99214 OFFICE O/P EST MOD 30 MIN: CPT | Mod: 25 | Performed by: FAMILY MEDICINE

## 2019-01-10 PROCEDURE — G0009 ADMIN PNEUMOCOCCAL VACCINE: HCPCS | Performed by: FAMILY MEDICINE

## 2019-01-10 RX ORDER — METFORMIN HYDROCHLORIDE 750 MG/1
750 TABLET, EXTENDED RELEASE ORAL 2 TIMES DAILY
Qty: 180 TAB | Refills: 0 | Status: SHIPPED | OUTPATIENT
Start: 2019-01-10 | End: 2019-01-30

## 2019-01-10 ASSESSMENT — PATIENT HEALTH QUESTIONNAIRE - PHQ9: CLINICAL INTERPRETATION OF PHQ2 SCORE: 0

## 2019-01-10 NOTE — PATIENT INSTRUCTIONS
Please measure the urine every time you urinate for a day.  Please write the amounts down    Find an [facility] center - you can see an optometrist

## 2019-01-10 NOTE — ASSESSMENT & PLAN NOTE
He describes frequent urination throughout the day and night.  He is going multiple times an hour.  He is not sure if flomax is helping.

## 2019-01-10 NOTE — ASSESSMENT & PLAN NOTE
He is taking four medications for this, hctz, amlodipine, lisinopril and atenolol.  He has been under more stress lately.  Diabetes is currently uncontrolled.

## 2019-01-15 ENCOUNTER — NON-PROVIDER VISIT (OUTPATIENT)
Dept: MEDICAL GROUP | Facility: PHYSICIAN GROUP | Age: 69
End: 2019-01-15
Payer: MEDICARE

## 2019-01-15 ENCOUNTER — HOSPITAL ENCOUNTER (OUTPATIENT)
Dept: LAB | Facility: MEDICAL CENTER | Age: 69
End: 2019-01-15
Attending: FAMILY MEDICINE
Payer: MEDICARE

## 2019-01-15 DIAGNOSIS — E11.65 UNCONTROLLED TYPE 2 DIABETES MELLITUS WITH HYPERGLYCEMIA (HCC): ICD-10-CM

## 2019-01-15 DIAGNOSIS — B18.2 CHRONIC HEPATITIS C WITHOUT HEPATIC COMA (HCC): ICD-10-CM

## 2019-01-15 LAB
EST. AVERAGE GLUCOSE BLD GHB EST-MCNC: 332 MG/DL
HBA1C MFR BLD: 13.2 % (ref 0–5.6)

## 2019-01-15 PROCEDURE — 83036 HEMOGLOBIN GLYCOSYLATED A1C: CPT | Mod: GA

## 2019-01-15 PROCEDURE — G0475 HIV COMBINATION ASSAY: HCPCS | Mod: GA

## 2019-01-15 PROCEDURE — 36415 COLL VENOUS BLD VENIPUNCTURE: CPT | Mod: GA

## 2019-01-15 PROCEDURE — 99211 OFF/OP EST MAY X REQ PHY/QHP: CPT | Performed by: FAMILY MEDICINE

## 2019-01-15 NOTE — ASSESSMENT & PLAN NOTE
He has a back brace which he is using. He also has voltaren gel which he has not started yet.  Images show minimal multilevel degenerative disease.

## 2019-01-15 NOTE — PROGRESS NOTES
CC: frequent urination    HISTORY OF PRESENT ILLNESS: Patient is a 68 y.o. male established patient who presents today to discuss the following chronic conditions    Health Maintenance: reviewed, retinal screening reminded,  Needs AWAV    Chronic hepatitis C without hepatic coma (HCC)  He has see GI and is awaiting approval of the medication.    Nocturia  He describes frequent urination throughout the day and night.  He is going multiple times an hour.  He is not sure if flomax is helping.      Essential hypertension  He is taking four medications for this, hctz, amlodipine, lisinopril and atenolol.  He has been under more stress lately.  Diabetes is currently uncontrolled.    Uncontrolled type 2 diabetes mellitus (HCC)  He has been taking metformin for diabetes.  He is drinking a lot of fruit juices and is mostly vegetarian.    Lab Results   Component Value Date/Time    HBA1C 11.5 01/10/2019 03:54 PM    HBA1C 8.2 (H) 08/20/2018 08:58 AM         Chronic midline low back pain without sciatica  He has a back brace which he is using. He also has voltaren gel which he has not started yet.  Images show minimal multilevel degenerative disease.      Patient Active Problem List    Diagnosis Date Noted   • Paranoia (HCC) 12/12/2018   • Vasculogenic erectile dysfunction 10/09/2018   • Chronic hepatitis C without hepatic coma (HCC) 09/10/2018   • Chronic midline low back pain without sciatica 09/10/2018   • Tremor of right hand 08/13/2018   • Nocturia 08/13/2018   • Essential hypertension 08/13/2018   • Uncontrolled type 2 diabetes mellitus (HCC) 08/13/2018   • Vitamin B12 deficiency 08/13/2018   • Anemia due to vitamin B12 deficiency 08/13/2018      Allergies:Patient has no known allergies.    Current Outpatient Prescriptions   Medication Sig Dispense Refill   • metFORMIN ER (GLUCOPHAGE XR) 750 MG TABLET SR 24 HR Take 1 Tab by mouth 2 times a day for 90 days. 180 Tab 0   • Empagliflozin 10 MG Tab Take 1 Tab by mouth every  day. 90 Tab 0   • lidocaine (LIDODERM) 5 % Patch Apply 1 Patch to skin as directed every 24 hours. 10 Patch 2   • tamsulosin (FLOMAX) 0.4 MG capsule Take 1 Cap by mouth ONE-HALF HOUR AFTER BREAKFAST. 90 Cap 0   • sildenafil citrate (VIAGRA) 50 MG tablet Take 1 Tab by mouth as needed for Erectile Dysfunction. 6 Tab 3   • lisinopril (PRINIVIL, ZESTRIL) 40 MG tablet Take 1 Tab by mouth every day. 90 Tab 1   • atenolol (TENORMIN) 50 MG Tab Take 1 Tab by mouth 2 times a day. 180 Tab 0   • amLODIPine (NORVASC) 10 MG Tab Take 1 Tab by mouth every day. 90 Tab 1   • hydroCHLOROthiazide (HYDRODIURIL) 25 MG Tab Take 1 Tab by mouth every day. 90 Tab 0   • glucosamine 500 MG Cap Take 1 Cap by mouth every day.     • Calcium (OYSTER-BLADE 500 PO) Take 1 Tab by mouth 2 Times a Day.     • multivitamin (THERAGRAN) Tab Take 1 Tab by mouth every day.     • BABY ASPIRIN PO Take 81 mg by mouth every day.       No current facility-administered medications for this visit.        Social History   Substance Use Topics   • Smoking status: Former Smoker     Packs/day: 0.50     Years: 5.00     Types: Cigarettes     Quit date: 1/1/1978   • Smokeless tobacco: Never Used   • Alcohol use No     Social History     Social History Narrative    Released from prison in 7/2018, nonviolent offense, volunteering        Family History   Problem Relation Age of Onset   • Stroke Mother         aneurysm   • Diabetes Sister    • Asthma Brother        Review of Systems:      - Constitutional: positive for weight loss    - Respiratory: Negative for cough, sputum production, chest congestion, dyspnea, wheezing, and crackles.      - Cardiovascular: Negative for chest pain, palpitations, orthopnea, and bilateral lower extremity edema.     - Gastrointestinal: Negative for heartburn, nausea, vomiting, abdominal pain, hematochezia, melena, diarrhea, constipation, and greasy/foul-smelling stools.     - Genitourinary: Negative for dysuria, polyuria, hematuria, pyuria,    -  "Neurological: Negative for dizziness, tingling, tremors, focal sensory deficit, focal weakness and headaches.     Exam:    Blood pressure 142/90, pulse (!) 116, temperature 36.4 °C (97.6 °F), resp. rate 16, height 1.727 m (5' 8\"), weight 86.6 kg (191 lb), SpO2 96 %. Body mass index is 29.04 kg/m².    General:  Well nourished, well developed male in NAD  Head is grossly normal.  Neck: Supple without JVD or bruit. Thyroid is not enlarged.  Pulmonary: Clear to ausculation and percussion.  Normal effort. No rales, ronchi, or wheezing.  Cardiovascular: Regular rate and rhythm without murmur. Carotid and radial pulses are intact and equal bilaterally.  Extremities: no clubbing, cyanosis, or edema.    Please note that this dictation was created using voice recognition software. I have made every reasonable attempt to correct obvious errors, but I expect that there are errors of grammar and possibly content that I did not discover before finalizing the note.    Assessment/Plan:  1. Nocturia  This may be multifactorial but is certainly complicated by uncontrolled diabetes.  We will continue current flomax and focus on controlling sugar intake and fluid intake.    - POCT  A1C    2. Chronic hepatitis C without hepatic coma (HCC)  Continue management with GI  - HIV AG/AB COMBO ASSAY SCREENING; Future    3. Type 2 diabetes mellitus without complication, without long-term current use of insulin (HCC)  This is uncontrolled on oral medications alone.  He was advised that at his current A1c oral medications alone will unlikely be sufficient to control his A1c without significant lifestyle change.  I agree with a vegetarian diet but this will need to be modified to be lower glycemic and diabetic education follow up is indicated.  He declines any injectable medications at this time though I would like to consider a GLP-1 agonist if he is not improving with jardianz and metformin alone.    - POCT  A1C  - metFORMIN ER (GLUCOPHAGE XR) " 750 MG TABLET SR 24 HR; Take 1 Tab by mouth 2 times a day for 90 days.  Dispense: 180 Tab; Refill: 0  - REFERRAL TO DIABETIC EDUCATION Diabetes Self Management Education / Training (DSME/T) and Medical Nutrition Therapy (MNT): Initial Group DSME/MNT as authorized by payor, Follow-Up DSME/MNT as authorized by payor; DSME/T Content: Monitoring Diabete...    4. Essential hypertension  He is on four medications for hypertension, diabetes is likely contributing.  This is not acutely dangerous, will get diabetes under control and readdress.      5. Chronic midline low back pain without sciatica  He is interested in exercises to help manage his pain and is referred to PT.  - REFERRAL TO PHYSICAL THERAPY Reason for Therapy: Eval/Treat/Report    6. Need for vaccination  - Tdap =>8yo IM  - Prevnar 13 PCV-13    7. Uncontrolled type 2 diabetes mellitus with hyperglycemia (HCC)  Poorly controlled diabetes is his biggest issue currently.  Will increase oral medications, work on lifestyle therapy and follow closely.  - Empagliflozin 10 MG Tab; Take 1 Tab by mouth every day.  Dispense: 90 Tab; Refill: 0  - HEMOGLOBIN A1C; Future

## 2019-01-15 NOTE — PROGRESS NOTES
"RN-CDE Note    Subjective:     Health changes since last visit/interval Hx: None    Medications (including changes made today)  Current Outpatient Prescriptions   Medication Sig Dispense Refill   • metFORMIN ER (GLUCOPHAGE XR) 750 MG TABLET SR 24 HR Take 1 Tab by mouth 2 times a day for 90 days. 180 Tab 0   • Empagliflozin 10 MG Tab Take 1 Tab by mouth every day. 90 Tab 0   • lidocaine (LIDODERM) 5 % Patch Apply 1 Patch to skin as directed every 24 hours. 10 Patch 2   • tamsulosin (FLOMAX) 0.4 MG capsule Take 1 Cap by mouth ONE-HALF HOUR AFTER BREAKFAST. 90 Cap 0   • sildenafil citrate (VIAGRA) 50 MG tablet Take 1 Tab by mouth as needed for Erectile Dysfunction. 6 Tab 3   • lisinopril (PRINIVIL, ZESTRIL) 40 MG tablet Take 1 Tab by mouth every day. 90 Tab 1   • atenolol (TENORMIN) 50 MG Tab Take 1 Tab by mouth 2 times a day. 180 Tab 0   • amLODIPine (NORVASC) 10 MG Tab Take 1 Tab by mouth every day. 90 Tab 1   • hydroCHLOROthiazide (HYDRODIURIL) 25 MG Tab Take 1 Tab by mouth every day. 90 Tab 0   • glucosamine 500 MG Cap Take 1 Cap by mouth every day.     • Calcium (OYSTER-BLADE 500 PO) Take 1 Tab by mouth 2 Times a Day.     • multivitamin (THERAGRAN) Tab Take 1 Tab by mouth every day.     • BABY ASPIRIN PO Take 81 mg by mouth every day.       No current facility-administered medications for this visit.        Taking daily ASA: No  Taking above medications as prescribed: yes  SIDE EFFECTS: Patient denies side effects to medications    Exercise: sporadic irregular exercise, <half hour walking weekly  Diet: \"healthy\" diet  in general  Patient's body mass index is unknown because there is no height or weight on file. Exercise and nutrition counseling were performed at this visit.      Health Maintenance:   Health Maintenance Due   Topic Date Due   • Annual Wellness Visit  1950   • RETINAL SCREENING  12/24/1967   • IMM HEP B VACCINE (1 of 3 - Risk 3-dose series) 12/24/1968       Immunizations:   PPSV23: " Up-to-date  [id number]: Up-to-date  Tdap: Up-to-date  Flu: Up-to-date  Hep B: Up-to-date    DM:   Last A1c:   Lab Results   Component Value Date/Time    HBA1C 11.5 01/10/2019 03:54 PM      A1C GOAL: < 7    Glucose monitoring frequency: not testing at this time  will order  Hypoglycemic episodes: no    Last Retinal Exam: on file and up-to-date  Daily Foot Exam: Yes   Routine Dental Exams: No    Lab Results   Component Value Date/Time    MALBCRT 16 08/20/2018 08:58 AM    MICROALBUR 5.9 08/20/2018 08:58 AM        ACR Albumin/Creatinine Ratio goal <30     HTN:   Blood pressure goal <140/<80 .   Currently Rx ACE/ARB: No    Dyslipidemia:    Lab Results   Component Value Date/Time    CHOLSTRLTOT 189 08/20/2018 08:58 AM     (H) 08/20/2018 08:58 AM    HDL 38 (A) 08/20/2018 08:58 AM    TRIGLYCERIDE 147 08/20/2018 08:58 AM       Lab Results   Component Value Date/Time    SODIUM 138 09/13/2018 02:27 PM    POTASSIUM 4.6 09/13/2018 02:27 PM    CHLORIDE 106 09/13/2018 02:27 PM    CO2 24 09/13/2018 02:27 PM    GLUCOSE 148 (H) 09/13/2018 02:27 PM    BUN 20 09/13/2018 02:27 PM    CREATININE 1.26 09/13/2018 02:27 PM     Lab Results   Component Value Date/Time    ALKPHOSPHAT 54 09/13/2018 02:27 PM    ASTSGOT 52 (H) 12/26/2018 09:03 AM    ALTSGPT 59 (H) 12/26/2018 09:03 AM    TBILIRUBIN 0.6 09/13/2018 02:27 PM        Currently Rx Statin: Yes    He  reports that he quit smoking about 41 years ago. His smoking use included Cigarettes. He has a 2.50 pack-year smoking history. He has never used smokeless tobacco.    Objective:     Exam:  Monofilament: not done    Plan:     Discussed and educated on:   - All medications, side effects and compliance (discussed carefully)  - Annual eye examinations at Ophthalmology  - Diabetic Meal Plan: foods that contain carbs  - Home glucose monitoring emphasized  - Weight control and daily exercise    Recommended medication changes: jardiance just added and metformin increased. Glucometer  ordered  Increased metformin to 1000 mg BID ordered placed and pended

## 2019-01-16 LAB — HIV 1+2 AB+HIV1 P24 AG SERPL QL IA: NON REACTIVE

## 2019-01-22 ENCOUNTER — NON-PROVIDER VISIT (OUTPATIENT)
Dept: MEDICAL GROUP | Facility: PHYSICIAN GROUP | Age: 69
End: 2019-01-22
Payer: MEDICARE

## 2019-01-22 DIAGNOSIS — E11.65 UNCONTROLLED TYPE 2 DIABETES MELLITUS WITH HYPERGLYCEMIA (HCC): ICD-10-CM

## 2019-01-22 DIAGNOSIS — E53.8 VITAMIN B12 DEFICIENCY: ICD-10-CM

## 2019-01-22 RX ORDER — CYANOCOBALAMIN 1000 UG/ML
1000 INJECTION, SOLUTION INTRAMUSCULAR; SUBCUTANEOUS ONCE
Status: COMPLETED | OUTPATIENT
Start: 2019-01-22 | End: 2019-01-22

## 2019-01-22 RX ORDER — LANCETS 30 GAUGE
EACH MISCELLANEOUS
Qty: 100 EACH | Refills: 0 | Status: SHIPPED | OUTPATIENT
Start: 2019-01-22 | End: 2019-07-02 | Stop reason: SDUPTHER

## 2019-01-22 RX ADMIN — CYANOCOBALAMIN 1000 MCG: 1000 INJECTION, SOLUTION INTRAMUSCULAR; SUBCUTANEOUS at 18:14

## 2019-01-30 ENCOUNTER — ANTICOAGULATION MONITORING (OUTPATIENT)
Dept: MEDICAL GROUP | Facility: PHYSICIAN GROUP | Age: 69
End: 2019-01-30

## 2019-01-30 ENCOUNTER — NON-PROVIDER VISIT (OUTPATIENT)
Dept: MEDICAL GROUP | Facility: PHYSICIAN GROUP | Age: 69
End: 2019-01-30
Payer: MEDICARE

## 2019-01-30 VITALS
HEIGHT: 70 IN | WEIGHT: 192 LBS | DIASTOLIC BLOOD PRESSURE: 58 MMHG | BODY MASS INDEX: 27.49 KG/M2 | SYSTOLIC BLOOD PRESSURE: 120 MMHG

## 2019-01-30 DIAGNOSIS — E11.00 TYPE 2 DIABETES MELLITUS WITH HYPEROSMOLARITY WITHOUT COMA, WITHOUT LONG-TERM CURRENT USE OF INSULIN (HCC): ICD-10-CM

## 2019-01-30 LAB — GLUCOSE BLD-MCNC: 306 MG/DL (ref 70–100)

## 2019-01-30 PROCEDURE — 82962 GLUCOSE BLOOD TEST: CPT | Performed by: FAMILY MEDICINE

## 2019-01-30 PROCEDURE — 99402 PREV MED CNSL INDIV APPRX 30: CPT | Performed by: FAMILY MEDICINE

## 2019-01-30 NOTE — PATIENT INSTRUCTIONS
1. Victoza 0.6mg sub q daily  2. Increase Jardiance to 20mg daily  3.Continue Metformin 1000mg twice daily  4. Bring blood sugar logs to next visit

## 2019-01-30 NOTE — NON-PROVIDER
New Patient Consult Note  Primary care physician: Alisia Ribeiro M.D.  Start time: 1530  Stop time:1600    Reason for consult: Management of Uncontrolled Type 2 Diabetes    HPI:  Chuck Bullard is a 68 y.o. old patient who comes in today for evaluation of above stated problem.    Most Recent HbA1c:   Lab Results   Component Value Date/Time    HBA1C 13.2 (H) 01/15/2019 09:58 AM      FSBG in office 306  Current Diabetes Regimen:    SGLT-2 Inhibitor:  Empagliflozin 10 mg once daily   Metformin  1000mg po bid    Other: none    Before Breakfast: 300-400  Before Lunch:  Before Dinner:  Before Bedtime:  Other times:  Hypoglycemia:  None      ROS:  Constitutional: No weight loss  Cardiac: No palpitations or racing heart  Resp: No shortness of breath  Neuro: No numbness or tinging in feet  Endo: No heat or cold intolerance, no polyuria or polydipsia  All other systems were reviewed and were negative.    Past Medical History:  Patient Active Problem List    Diagnosis Date Noted   • Paranoia (HCC) 12/12/2018   • Vasculogenic erectile dysfunction 10/09/2018   • Chronic hepatitis C without hepatic coma (HCC) 09/10/2018   • Chronic midline low back pain without sciatica 09/10/2018   • Tremor of right hand 08/13/2018   • Nocturia 08/13/2018   • Essential hypertension 08/13/2018   • Uncontrolled type 2 diabetes mellitus (HCC) 08/13/2018   • Vitamin B12 deficiency 08/13/2018   • Anemia due to vitamin B12 deficiency 08/13/2018       Past Surgical History:  No past surgical history on file.    Allergies:  Patient has no known allergies.    Social History:  Social History     Social History   • Marital status: Single     Spouse name: N/A   • Number of children: N/A   • Years of education: N/A     Occupational History   • Not on file.     Social History Main Topics   • Smoking status: Former Smoker     Packs/day: 0.50     Years: 5.00     Types: Cigarettes     Quit date: 1/1/1978   • Smokeless tobacco: Never Used   • Alcohol use No   •  Drug use: No   • Sexual activity: No     Other Topics Concern   • Not on file     Social History Narrative    Released from residential in 7/2018, nonviolent offense, volunteering        Family History:  Family History   Problem Relation Age of Onset   • Stroke Mother         aneurysm   • Diabetes Sister    • Asthma Brother        Medications:    Current Outpatient Prescriptions:   •  glecaprevir-pibrentasvir 100-40 mg tablet, Take  by mouth every day at 6 PM., Disp: , Rfl:   •  Blood Glucose Monitoring Suppl Device, Meter: Dispense Device of Insurance Preference. Sig. Use as directed for blood sugar monitoring. #1. NR., Disp: 1 Device, Rfl: 0  •  Blood Glucose Test Strips, Test strips order: Test strips for covered meter. Sig: use three times a day and prn ssx high or low sugar #100 RF x 3, Disp: 100 Each, Rfl: 2  •  Lancets, Lancets order: Lancets for provided meter. Sig: use three times daily and prn ssx high or low sugar. #100 RF x 3, Disp: 100 Each, Rfl: 0  •  metformin (GLUCOPHAGE) 1000 MG tablet, Take 1 Tab by mouth 2 times a day, with meals., Disp: 60 Tab, Rfl: 11  •  Empagliflozin 10 MG Tab, Take 1 Tab by mouth every day., Disp: 90 Tab, Rfl: 0  •  tamsulosin (FLOMAX) 0.4 MG capsule, Take 1 Cap by mouth ONE-HALF HOUR AFTER BREAKFAST., Disp: 90 Cap, Rfl: 0  •  lisinopril (PRINIVIL, ZESTRIL) 40 MG tablet, Take 1 Tab by mouth every day., Disp: 90 Tab, Rfl: 1  •  atenolol (TENORMIN) 50 MG Tab, Take 1 Tab by mouth 2 times a day., Disp: 180 Tab, Rfl: 0  •  amLODIPine (NORVASC) 10 MG Tab, Take 1 Tab by mouth every day., Disp: 90 Tab, Rfl: 1  •  hydroCHLOROthiazide (HYDRODIURIL) 25 MG Tab, Take 1 Tab by mouth every day., Disp: 90 Tab, Rfl: 0  •  glucosamine 500 MG Cap, Take 1 Cap by mouth every day., Disp: , Rfl:   •  multivitamin (THERAGRAN) Tab, Take 1 Tab by mouth every day., Disp: , Rfl:   •  BABY ASPIRIN PO, Take 81 mg by mouth every day., Disp: , Rfl:   •  sildenafil citrate (VIAGRA) 50 MG tablet, Take 1 Tab by  "mouth as needed for Erectile Dysfunction. (Patient not taking: Reported on 1/30/2019), Disp: 6 Tab, Rfl: 3  •  Calcium (OYSTER-BLADE 500 PO), Take 1 Tab by mouth 2 Times a Day., Disp: , Rfl:     Labs: Reviewed    Physical Examination:  Vital signs: /58   Ht 1.778 m (5' 10\")   Wt 87.1 kg (192 lb)   BMI 27.55 kg/m²  Body mass index is 27.55 kg/m².  General: No apparent distress, cooperative  Eyes: No scleral icterus or discharge  ENMT: Normal on external inspection of nose, lips, normal thyroid exam  Neck: No abnormal masses on inspection  Resp: Normal effort, clear to auscultation bilaterally   CVS: Regular rate and rhythm, S1 S2 normal, no murmur   Extremities: No edema  Abdomen: abdominal obesity present  Neuro: Alert and oriented  Skin: No rash  Psych: Normal mood and affect, intact memory and able to make informed decisions    Assessment and Plan:     1. Will start Victoza 0.6mg sub q daily, will optimize to Victoza 1.8mg sub q daily as tolerated  2..  Pt is currently on Jardiance 10mg, will increase to 20mg until his supply is gone, then will further optimize to 25mg po daily  3. Continue Metformin 1000mg po bid  4. Pt has been exercising more since his last hospitalization for DKA  5.  Long discussion about diet, discussed the plate method, pt is willing to try low carbohydrate, and pescaterian diet.      Return in about 2 weeks (around 2/13/2019).    Thank you for allowing me to participate in the care of this patient.    Jonelle Lagunas  01/30/19    CC:   Alisia Ribeiro M.D.    This note was created using voice recognition software (Dragon). The accuracy of the dictation is limited by the abilities of the software. I have reviewed the note prior to signing, however some errors in grammar and context are still possible. If you have any questions related to this note please do not hesitate to contact our office.   "

## 2019-01-31 ENCOUNTER — OFFICE VISIT (OUTPATIENT)
Dept: MEDICAL GROUP | Facility: PHYSICIAN GROUP | Age: 69
End: 2019-01-31
Payer: MEDICARE

## 2019-01-31 VITALS
TEMPERATURE: 98 F | WEIGHT: 186 LBS | RESPIRATION RATE: 14 BRPM | OXYGEN SATURATION: 98 % | BODY MASS INDEX: 26.63 KG/M2 | SYSTOLIC BLOOD PRESSURE: 110 MMHG | DIASTOLIC BLOOD PRESSURE: 70 MMHG | HEART RATE: 78 BPM | HEIGHT: 70 IN

## 2019-01-31 DIAGNOSIS — I10 ESSENTIAL HYPERTENSION: ICD-10-CM

## 2019-01-31 DIAGNOSIS — B18.2 CHRONIC HEPATITIS C WITHOUT HEPATIC COMA (HCC): ICD-10-CM

## 2019-01-31 DIAGNOSIS — E11.65 UNCONTROLLED TYPE 2 DIABETES MELLITUS WITH HYPERGLYCEMIA (HCC): ICD-10-CM

## 2019-01-31 PROCEDURE — 99214 OFFICE O/P EST MOD 30 MIN: CPT | Performed by: FAMILY MEDICINE

## 2019-01-31 RX ORDER — GLUCOSAMINE HCL/CHONDROITIN SU 500-400 MG
1 CAPSULE ORAL 3 TIMES DAILY
Qty: 100 EACH | Refills: 2 | Status: SHIPPED | OUTPATIENT
Start: 2019-01-31

## 2019-01-31 NOTE — PATIENT INSTRUCTIONS
The maximum daily dose of metformin is 2500 mg.      Have your labs done prior to your next visit.

## 2019-01-31 NOTE — ASSESSMENT & PLAN NOTE
He started taking victoza yesterday after seeing Jonelle Lagunas, pharmacist.  He reports his Blood sugar was 199 this morning, much lower than it has been in weeks, even after an ER visit when he was given insulin.  He is taking jardiance which he increased to 20 mg daily and metformin 2000 mg twice a day and adds an additional 750 mg as needed.    Lab Results   Component Value Date/Time    HBA1C 13.2 (H) 01/15/2019 09:58 AM    HBA1C 11.5 01/10/2019 03:54 PM

## 2019-01-31 NOTE — PROGRESS NOTES
CC: diabetes    HISTORY OF PRESENT ILLNESS: Patient is a 68 y.o. male established patient who presents today to     Health Maintenance: reviewed    Chronic hepatitis C without hepatic coma (HCC)  He has started medications for this.  He does feel tired on the medication.    Essential hypertension  His BP has been normal at home.  He is feeling well on amlodipine, hctz, atenolol and lisinopril.    Uncontrolled type 2 diabetes mellitus (HCC)  He started taking victoza yesterday after seeing Jonelle Lagunas, pharmacist.  He reports his Blood sugar was 199 this morning, much lower than it has been in weeks, even after an ER visit when he was given insulin.  He is taking jardiance which he increased to 20 mg daily and metformin 2000 mg twice a day and adds an additional 750 mg as needed.    Lab Results   Component Value Date/Time    HBA1C 13.2 (H) 01/15/2019 09:58 AM    HBA1C 11.5 01/10/2019 03:54 PM           Patient Active Problem List    Diagnosis Date Noted   • Paranoia (HCC) 12/12/2018   • Vasculogenic erectile dysfunction 10/09/2018   • Chronic hepatitis C without hepatic coma (HCC) 09/10/2018   • Chronic midline low back pain without sciatica 09/10/2018   • Tremor of right hand 08/13/2018   • Nocturia 08/13/2018   • Essential hypertension 08/13/2018   • Uncontrolled type 2 diabetes mellitus (HCC) 08/13/2018   • Vitamin B12 deficiency 08/13/2018   • Anemia due to vitamin B12 deficiency 08/13/2018      Allergies:Patient has no known allergies.    Current Outpatient Prescriptions   Medication Sig Dispense Refill   • liraglutide (VICTOZA) 18 MG/3ML Solution Pen-injector injection Inject 0.1 mL as instructed every day. 6 mL 0   • glecaprevir-pibrentasvir 100-40 mg tablet Take  by mouth every day at 6 PM.     • Empagliflozin 10 MG Tab Take 2 Tabs by mouth every day. 90 Tab 0   • Alcohol Swabs 1 Each by Does not apply route 3 times a day. 100 Each 2   • Blood Glucose Monitoring Suppl Device Meter: Dispense Device of  Insurance Preference. Sig. Use as directed for blood sugar monitoring. #1. NR. 1 Device 0   • Blood Glucose Test Strips Test strips order: Test strips for covered meter. Sig: use three times a day and prn ssx high or low sugar #100 RF x 3 100 Each 2   • Lancets Lancets order: Lancets for provided meter. Sig: use three times daily and prn ssx high or low sugar. #100 RF x 3 100 Each 0   • metformin (GLUCOPHAGE) 1000 MG tablet Take 1 Tab by mouth 2 times a day, with meals. 60 Tab 11   • tamsulosin (FLOMAX) 0.4 MG capsule Take 1 Cap by mouth ONE-HALF HOUR AFTER BREAKFAST. 90 Cap 0   • lisinopril (PRINIVIL, ZESTRIL) 40 MG tablet Take 1 Tab by mouth every day. 90 Tab 1   • atenolol (TENORMIN) 50 MG Tab Take 1 Tab by mouth 2 times a day. 180 Tab 0   • amLODIPine (NORVASC) 10 MG Tab Take 1 Tab by mouth every day. 90 Tab 1   • hydroCHLOROthiazide (HYDRODIURIL) 25 MG Tab Take 1 Tab by mouth every day. 90 Tab 0   • glucosamine 500 MG Cap Take 1 Cap by mouth every day.     • Calcium (OYSTER-BLADE 500 PO) Take 1 Tab by mouth 2 Times a Day.     • multivitamin (THERAGRAN) Tab Take 1 Tab by mouth every day.     • BABY ASPIRIN PO Take 81 mg by mouth every day.       No current facility-administered medications for this visit.        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      - Cardiovascular: Negative for chest pain, palpitations, orthopnea, and bilateral lower extremity  "edema.     - Gastrointestinal: Negative for heartburn, nausea, vomiting, abdominal pain, hematochezia, melena, diarrhea, constipation, and greasy/foul-smelling stools.       - Neurological: Negative for dizziness, tingling, tremors, focal sensory deficit, focal weakness and headaches.        Exam:    Blood pressure 110/70, pulse 78, temperature 36.7 °C (98 °F), resp. rate 14, height 1.778 m (5' 10\"), weight 84.4 kg (186 lb), SpO2 98 %. Body mass index is 26.69 kg/m².    General:  Well nourished, well developed male in NAD  Head is grossly normal.  Neck: Supple without JVD or bruit. Thyroid is not enlarged.  Pulmonary: Clear to ausculation and percussion.  Normal effort. No rales, ronchi, or wheezing.  Cardiovascular: Regular rate and rhythm without murmur. Carotid and radial pulses are intact and equal bilaterally.  Extremities: no clubbing, cyanosis, or edema.    Please note that this dictation was created using voice recognition software. I have made every reasonable attempt to correct obvious errors, but I expect that there are errors of grammar and possibly content that I did not discover before finalizing the note.    Assessment/Plan:  1. Uncontrolled type 2 diabetes mellitus with hyperglycemia (HCC)  His BG levels have improved remarkably after starting victoza.  He is on an ideal regimen for diabetes now with metformin, an SGLT2 inhibitor and a GLP-1 agonist.  He is tolerating the medications well.  Metformin dosing was reviewed, no more than 2500 mg daily.  Continue current dosing of victoza and jardiance.  Will increase to maximum jardiance with next refill.  - liraglutide (VICTOZA) 18 MG/3ML Solution Pen-injector injection; Inject 0.1 mL as instructed every day.  Dispense: 6 mL; Refill: 0  - Empagliflozin 10 MG Tab; Take 2 Tabs by mouth every day.  Dispense: 90 Tab; Refill: 0  - Alcohol Swabs; 1 Each by Does not apply route 3 times a day.  Dispense: 100 Each; Refill: 2    2. Chronic hepatitis C without " hepatic coma (HCC)  He is on appropriate treatment, continue to follow with GI.  - glecaprevir-pibrentasvir 100-40 mg tablet; Take  by mouth every day at 6 PM.    3. Essential hypertension  This is at goal on four medications today.  Continue current medications, he will monitor for symptoms of hypotension, currently asymptomatic.

## 2019-02-06 ENCOUNTER — TELEPHONE (OUTPATIENT)
Dept: MEDICAL GROUP | Facility: PHYSICIAN GROUP | Age: 69
End: 2019-02-06

## 2019-02-06 NOTE — TELEPHONE ENCOUNTER
The patient called in stating that the referral that was placed for him to do PT the facility is to far for him due to him no having any transportation and there are no city buses that go out that way. He is wanting to know what exercises that you would recommend he do to strengthen his back? He also states that [place] and [place] weights and wanted to know if those are ok for him to be doing?

## 2019-02-07 ENCOUNTER — TELEPHONE (OUTPATIENT)
Dept: MEDICAL GROUP | Facility: MEDICAL CENTER | Age: 69
End: 2019-02-07

## 2019-02-07 ENCOUNTER — HOSPITAL ENCOUNTER (OUTPATIENT)
Dept: LAB | Facility: MEDICAL CENTER | Age: 69
End: 2019-02-07
Attending: INTERNAL MEDICINE
Payer: MEDICARE

## 2019-02-07 LAB
ALBUMIN SERPL BCP-MCNC: 4.4 G/DL (ref 3.2–4.9)
ALBUMIN/GLOB SERPL: 1 G/DL
ALP SERPL-CCNC: 49 U/L (ref 30–99)
ALT SERPL-CCNC: 38 U/L (ref 2–50)
ANION GAP SERPL CALC-SCNC: 12 MMOL/L (ref 0–11.9)
AST SERPL-CCNC: 32 U/L (ref 12–45)
BASOPHILS # BLD AUTO: 0.8 % (ref 0–1.8)
BASOPHILS # BLD: 0.05 K/UL (ref 0–0.12)
BILIRUB SERPL-MCNC: 1 MG/DL (ref 0.1–1.5)
BUN SERPL-MCNC: 35 MG/DL (ref 8–22)
CALCIUM SERPL-MCNC: 10.4 MG/DL (ref 8.5–10.5)
CHLORIDE SERPL-SCNC: 101 MMOL/L (ref 96–112)
CO2 SERPL-SCNC: 23 MMOL/L (ref 20–33)
CREAT SERPL-MCNC: 1.57 MG/DL (ref 0.5–1.4)
EOSINOPHIL # BLD AUTO: 0.28 K/UL (ref 0–0.51)
EOSINOPHIL NFR BLD: 4.7 % (ref 0–6.9)
ERYTHROCYTE [DISTWIDTH] IN BLOOD BY AUTOMATED COUNT: 38.3 FL (ref 35.9–50)
GLOBULIN SER CALC-MCNC: 4.3 G/DL (ref 1.9–3.5)
GLUCOSE SERPL-MCNC: 115 MG/DL (ref 65–99)
HCT VFR BLD AUTO: 42.1 % (ref 42–52)
HGB BLD-MCNC: 13.8 G/DL (ref 14–18)
IMM GRANULOCYTES # BLD AUTO: 0.01 K/UL (ref 0–0.11)
IMM GRANULOCYTES NFR BLD AUTO: 0.2 % (ref 0–0.9)
LIPASE SERPL-CCNC: 52 U/L (ref 11–82)
LYMPHOCYTES # BLD AUTO: 2.03 K/UL (ref 1–4.8)
LYMPHOCYTES NFR BLD: 34.2 % (ref 22–41)
MCH RBC QN AUTO: 26.9 PG (ref 27–33)
MCHC RBC AUTO-ENTMCNC: 32.8 G/DL (ref 33.7–35.3)
MCV RBC AUTO: 82.1 FL (ref 81.4–97.8)
MONOCYTES # BLD AUTO: 0.71 K/UL (ref 0–0.85)
MONOCYTES NFR BLD AUTO: 12 % (ref 0–13.4)
NEUTROPHILS # BLD AUTO: 2.85 K/UL (ref 1.82–7.42)
NEUTROPHILS NFR BLD: 48.1 % (ref 44–72)
NRBC # BLD AUTO: 0 K/UL
NRBC BLD-RTO: 0 /100 WBC
PLATELET # BLD AUTO: 210 K/UL (ref 164–446)
PMV BLD AUTO: 11.8 FL (ref 9–12.9)
POTASSIUM SERPL-SCNC: 4 MMOL/L (ref 3.6–5.5)
PROT SERPL-MCNC: 8.7 G/DL (ref 6–8.2)
RBC # BLD AUTO: 5.13 M/UL (ref 4.7–6.1)
SODIUM SERPL-SCNC: 136 MMOL/L (ref 135–145)
WBC # BLD AUTO: 5.9 K/UL (ref 4.8–10.8)

## 2019-02-07 PROCEDURE — 36415 COLL VENOUS BLD VENIPUNCTURE: CPT

## 2019-02-07 PROCEDURE — 80053 COMPREHEN METABOLIC PANEL: CPT

## 2019-02-07 PROCEDURE — 83690 ASSAY OF LIPASE: CPT

## 2019-02-07 PROCEDURE — 85025 COMPLETE CBC W/AUTO DIFF WBC: CPT

## 2019-02-07 NOTE — TELEPHONE ENCOUNTER
The exercises he is doing should be fine as long as they are not worsening his pain.  He should probably limit bending repeatedly for now.  Ask him to see if he can find a PT office in a location that is convenient for him and I would be happy to send a new referral.  THanks

## 2019-02-07 NOTE — TELEPHONE ENCOUNTER
Chuck reports nausea and loss of appetite.  Will hold Victoza for three days.  Pt will f/u with his GI doctor, as the Mavyret has a high incidence of nausea and diarrhea (HE has both).  Pt will get his GI labs drawn this morning and f/u with his GI this afternoon.    Jonelle Lagunas, Clinical Pharmacist

## 2019-02-08 ENCOUNTER — TELEPHONE (OUTPATIENT)
Dept: MEDICAL GROUP | Facility: PHYSICIAN GROUP | Age: 69
End: 2019-02-08

## 2019-02-08 NOTE — TELEPHONE ENCOUNTER
Called and LM informing patient that per providers the exercises he is currently doing as long as they are not causing him more pain are perfectly fine for him. Also that if he could find a physical therapy office in town that is easier for him to get transportation to, he should call us and let us know the name of the facility.

## 2019-02-08 NOTE — TELEPHONE ENCOUNTER
[facility] called patient states to the pharmacy that he was to get an increase on his

## 2019-02-11 ENCOUNTER — TELEPHONE (OUTPATIENT)
Dept: VASCULAR LAB | Facility: MEDICAL CENTER | Age: 69
End: 2019-02-11

## 2019-02-11 NOTE — TELEPHONE ENCOUNTER
Spoke with Chuck who reports N/V/Diarrhea has resolved.  Will continue to HOLD victoza for a few more days.  Jonelle Lagunas, Clinical Pharmacist

## 2019-02-20 ENCOUNTER — ANTICOAGULATION MONITORING (OUTPATIENT)
Dept: MEDICAL GROUP | Facility: PHYSICIAN GROUP | Age: 69
End: 2019-02-20

## 2019-02-20 ENCOUNTER — NON-PROVIDER VISIT (OUTPATIENT)
Dept: MEDICAL GROUP | Facility: PHYSICIAN GROUP | Age: 69
End: 2019-02-20
Payer: MEDICARE

## 2019-02-20 ENCOUNTER — TELEPHONE (OUTPATIENT)
Dept: MEDICAL GROUP | Facility: PHYSICIAN GROUP | Age: 69
End: 2019-02-20

## 2019-02-20 VITALS — BODY MASS INDEX: 26 KG/M2 | WEIGHT: 181.2 LBS

## 2019-02-20 DIAGNOSIS — E11.69 TYPE 2 DIABETES MELLITUS WITH OTHER SPECIFIED COMPLICATION, WITHOUT LONG-TERM CURRENT USE OF INSULIN (HCC): ICD-10-CM

## 2019-02-20 DIAGNOSIS — R80.9 TYPE 2 DIABETES MELLITUS WITH MICROALBUMINURIA, WITH LONG-TERM CURRENT USE OF INSULIN (HCC): ICD-10-CM

## 2019-02-20 DIAGNOSIS — Z79.4 TYPE 2 DIABETES MELLITUS WITH MICROALBUMINURIA, WITH LONG-TERM CURRENT USE OF INSULIN (HCC): ICD-10-CM

## 2019-02-20 DIAGNOSIS — E11.29 TYPE 2 DIABETES MELLITUS WITH MICROALBUMINURIA, WITH LONG-TERM CURRENT USE OF INSULIN (HCC): ICD-10-CM

## 2019-02-20 LAB
GLUCOSE BLD-MCNC: 96 MG/DL (ref 70–100)
HBA1C MFR BLD: 11 % (ref ?–5.8)
INT CON NEG: NORMAL
INT CON POS: NORMAL

## 2019-02-20 PROCEDURE — 83036 HEMOGLOBIN GLYCOSYLATED A1C: CPT | Performed by: FAMILY MEDICINE

## 2019-02-20 PROCEDURE — 82962 GLUCOSE BLOOD TEST: CPT | Performed by: FAMILY MEDICINE

## 2019-02-20 PROCEDURE — 99402 PREV MED CNSL INDIV APPRX 30: CPT | Performed by: FAMILY MEDICINE

## 2019-02-20 RX ORDER — METFORMIN HYDROCHLORIDE 500 MG/1
1000 TABLET, EXTENDED RELEASE ORAL DAILY
Qty: 360 TAB | Refills: 1 | Status: SHIPPED | OUTPATIENT
Start: 2019-02-20 | End: 2019-04-24 | Stop reason: SDUPTHER

## 2019-02-20 NOTE — NON-PROVIDER
New Patient Consult Note  Primary care physician: Alisia Ribeiro M.D.  Start time: 14:19  End time: 15:01      Reason for consult: Management of Uncontrolled Type 2 Diabetes    HPI:  Chuck Bullard is a 68 y.o. old patient who comes in today for evaluation of above stated problem.    Most Recent HbA1c:   Lab Results   Component Value Date/Time    HBA1C 11.0 02/20/2019 02:38 PM      FSBG 96 in office  Current Diabetes Regimen:  GLP-1 Agent:  Currently off, as he did not tolerate with mayvret  SGLT-2 Inhibitor:  Empagliflozin 10 mg once daily   Metformin +1000 mg po bid     Before Breakfast:120 -0199  Before Lunch:  Before Dinner:  Before Bedtime:  Other times:  Hypoglycemia:  None      ROS:  Constitutional: pt lost ~ 5 lbs in the past month  Cardiac: No palpitations or racing heart  Resp: No shortness of breath  Neuro: No numbness or tinging in feet  Endo: No heat or cold intolerance, no polyuria or polydipsia  All other systems were reviewed and were negative.    Past Medical History:  Patient Active Problem List    Diagnosis Date Noted   • Paranoia (HCC) 12/12/2018   • Vasculogenic erectile dysfunction 10/09/2018   • Chronic hepatitis C without hepatic coma (HCC) 09/10/2018   • Chronic midline low back pain without sciatica 09/10/2018   • Tremor of right hand 08/13/2018   • Nocturia 08/13/2018   • Essential hypertension 08/13/2018   • Uncontrolled type 2 diabetes mellitus (HCC) 08/13/2018   • Vitamin B12 deficiency 08/13/2018   • Anemia due to vitamin B12 deficiency 08/13/2018       Past Surgical History:  No past surgical history on file.    Allergies:  Patient has no known allergies.    Social History:  Social History     Social History   • Marital status: Single     Spouse name: N/A   • Number of children: N/A   • Years of education: N/A     Occupational History   • Not on file.     Social History Main Topics   • Smoking status: Former Smoker     Packs/day: 0.50     Years: 5.00     Types: Cigarettes     Quit  date: 1/1/1978   • Smokeless tobacco: Never Used   • Alcohol use No   • Drug use: No   • Sexual activity: No     Other Topics Concern   • Not on file     Social History Narrative    Released from correction in 7/2018, nonviolent offense, volunteering        Family History:  Family History   Problem Relation Age of Onset   • Stroke Mother         aneurysm   • Diabetes Sister    • Asthma Brother    • Diabetes Brother        Medications:    Current Outpatient Prescriptions:   •  JARDIANCE 10 MG Tab, TAKE ONE TABLET BY MOUTH ONE TIME DAILY, Disp: 90 Tab, Rfl: 0  •  liraglutide (VICTOZA) 18 MG/3ML Solution Pen-injector injection, Inject 0.1 mL as instructed every day., Disp: 6 mL, Rfl: 0  •  glecaprevir-pibrentasvir 100-40 mg tablet, Take  by mouth every day at 6 PM., Disp: , Rfl:   •  Alcohol Swabs, 1 Each by Does not apply route 3 times a day., Disp: 100 Each, Rfl: 2  •  Blood Glucose Monitoring Suppl Device, Meter: Dispense Device of Insurance Preference. Sig. Use as directed for blood sugar monitoring. #1. NR., Disp: 1 Device, Rfl: 0  •  Blood Glucose Test Strips, Test strips order: Test strips for covered meter. Sig: use three times a day and prn ssx high or low sugar #100 RF x 3, Disp: 100 Each, Rfl: 2  •  Lancets, Lancets order: Lancets for provided meter. Sig: use three times daily and prn ssx high or low sugar. #100 RF x 3, Disp: 100 Each, Rfl: 0  •  metformin (GLUCOPHAGE) 1000 MG tablet, Take 1 Tab by mouth 2 times a day, with meals., Disp: 60 Tab, Rfl: 11  •  tamsulosin (FLOMAX) 0.4 MG capsule, Take 1 Cap by mouth ONE-HALF HOUR AFTER BREAKFAST., Disp: 90 Cap, Rfl: 0  •  lisinopril (PRINIVIL, ZESTRIL) 40 MG tablet, Take 1 Tab by mouth every day., Disp: 90 Tab, Rfl: 1  •  atenolol (TENORMIN) 50 MG Tab, Take 1 Tab by mouth 2 times a day., Disp: 180 Tab, Rfl: 0  •  amLODIPine (NORVASC) 10 MG Tab, Take 1 Tab by mouth every day., Disp: 90 Tab, Rfl: 1  •  hydroCHLOROthiazide (HYDRODIURIL) 25 MG Tab, Take 1 Tab by mouth  every day., Disp: 90 Tab, Rfl: 0  •  glucosamine 500 MG Cap, Take 1 Cap by mouth every day., Disp: , Rfl:   •  Calcium (OYSTER-BLADE 500 PO), Take 1 Tab by mouth 2 Times a Day., Disp: , Rfl:   •  multivitamin (THERAGRAN) Tab, Take 1 Tab by mouth every day., Disp: , Rfl:   •  BABY ASPIRIN PO, Take 81 mg by mouth every day., Disp: , Rfl:     Labs: Reviewed    Physical Examination:  Vital signs: Wt 82.2 kg (181 lb 3.2 oz)   BMI 26.00 kg/m²  Body mass index is 26 kg/m².  General: No apparent distress, cooperative  Eyes: No scleral icterus or discharge  ENMT: Normal on external inspection of nose, lips, normal thyroid exam  Neck: No abnormal masses on inspection  Resp: Normal effort, clear to auscultation bilaterally   CVS: Regular rate and rhythm, S1 S2 normal, no murmur   Extremities: No edema  Abdomen: abdominal obesity present  Neuro: Alert and oriented  Skin: No rash  Psych: Normal mood and affect, intact memory and able to make informed decisions    Assessment and Plan:    PT was not tolerating Victoza with his mavret, we will continue to hold victoza until he has finished his mayvret course next month.    Pt is tolerating Jardiance 10mg po daily, will increase to 25mg po daily  Pt is currently optimized on Metformin 1000mg po bid  Pt currently follows the plate method, avoiding white foods  Pt currently is meeting his exercise goal of 30 minutes 5-7 days per week    Pt A1c is down to 11.0 from 13.2 in 4 weeks. Will continue to monitor.      No Follow-up on file.    Thank you for allowing me to participate in the care of this patient.    Jonelle Lagunas  02/20/19    CC:   Alisia Ribeiro M.D.    This note was created using voice recognition software (Dragon). The accuracy of the dictation is limited by the abilities of the software. I have reviewed the note prior to signing, however some errors in grammar and context are still possible. If you have any questions related to this note please do not hesitate to  contact our office.

## 2019-02-20 NOTE — TELEPHONE ENCOUNTER
MEDICATION PRIOR AUTHORIZATION NEEDED:    1. Name of Medication: Lidocaine patch    2. Requested By (Name of Pharmacy): [facility]     3. Is insurance on file current? YES     4. What is the name & phone number of the 3rd party payor? N/A    Started via covermymeds  KEY: [id number]  PAR scanned into media    Pending approval/denial

## 2019-02-21 ENCOUNTER — NON-PROVIDER VISIT (OUTPATIENT)
Dept: MEDICAL GROUP | Facility: PHYSICIAN GROUP | Age: 69
End: 2019-02-21
Payer: MEDICARE

## 2019-02-21 DIAGNOSIS — E53.8 B12 DEFICIENCY: ICD-10-CM

## 2019-02-21 RX ORDER — CYANOCOBALAMIN 1000 UG/ML
1000 INJECTION, SOLUTION INTRAMUSCULAR; SUBCUTANEOUS ONCE
OUTPATIENT
Start: 2019-02-21 | End: 2019-02-22

## 2019-03-04 ENCOUNTER — TELEPHONE (OUTPATIENT)
Dept: MEDICAL GROUP | Facility: PHYSICIAN GROUP | Age: 69
End: 2019-03-04

## 2019-03-05 NOTE — TELEPHONE ENCOUNTER
I have reviewed the notes from the GI visit. Because he was losing weight they recommended a CT to rule out cancer.  It is possible that the diabetes caused the weight loss.  Uncontrolled diabetes as well as the medicines he is on can do that.  It is probably a reasonable test if he is willing, just to make sure everything is ok.  If he is feeling well or doesn't want it we can discuss this more at his next visit which should be in April but can be moved up if he likes.

## 2019-03-21 ENCOUNTER — NON-PROVIDER VISIT (OUTPATIENT)
Dept: MEDICAL GROUP | Facility: PHYSICIAN GROUP | Age: 69
End: 2019-03-21
Payer: MEDICARE

## 2019-03-21 DIAGNOSIS — E53.8 B12 DEFICIENCY: ICD-10-CM

## 2019-03-21 RX ORDER — CYANOCOBALAMIN 1000 UG/ML
1000 INJECTION, SOLUTION INTRAMUSCULAR; SUBCUTANEOUS ONCE
Status: COMPLETED | OUTPATIENT
Start: 2019-03-21 | End: 2019-03-21

## 2019-03-21 RX ADMIN — CYANOCOBALAMIN 1000 MCG: 1000 INJECTION, SOLUTION INTRAMUSCULAR; SUBCUTANEOUS at 17:02

## 2019-03-27 ENCOUNTER — NON-PROVIDER VISIT (OUTPATIENT)
Dept: MEDICAL GROUP | Facility: PHYSICIAN GROUP | Age: 69
End: 2019-03-27
Payer: MEDICARE

## 2019-03-27 DIAGNOSIS — E11.9 TYPE 2 DIABETES MELLITUS WITHOUT COMPLICATION, WITHOUT LONG-TERM CURRENT USE OF INSULIN (HCC): ICD-10-CM

## 2019-03-27 LAB
GLUCOSE BLD-MCNC: 101 MG/DL (ref 70–100)
HBA1C MFR BLD: 8.4 % (ref 0–5.6)
INT CON NEG: ABNORMAL
INT CON POS: ABNORMAL

## 2019-03-27 PROCEDURE — 82962 GLUCOSE BLOOD TEST: CPT | Performed by: NURSE PRACTITIONER

## 2019-03-27 PROCEDURE — 83036 HEMOGLOBIN GLYCOSYLATED A1C: CPT | Performed by: NURSE PRACTITIONER

## 2019-03-27 PROCEDURE — 99402 PREV MED CNSL INDIV APPRX 30: CPT | Performed by: NURSE PRACTITIONER

## 2019-03-27 RX ORDER — LANCETS
1 EACH MISCELLANEOUS DAILY
Qty: 100 EACH | Refills: 11 | Status: SHIPPED | OUTPATIENT
Start: 2019-03-27

## 2019-03-27 NOTE — NON-PROVIDER
New Patient Consult Note  Primary care physician: Alisia Ribeiro M.D.    Reason for consult: Management of Uncontrolled Type 2 Diabetes     Start time:2:40  End time: 3:17    HPI:  Chuck Bullard is a 68 y.o. old patient who comes in today for evaluation of above stated problem.    Most Recent HbA1c:   Lab Results   Component Value Date/Time    HBA1C 8.4 (A) 03/27/2019 03:21 PM        Current Diabetes Regimen:  GLP-1 Agent: currently off as he did not tolerate with mayvret  SGLT-2 Inhibitor:  Empagliflozin 25 mg once daily   Metformin 1000 po bid      Before Breakfast:   Before Lunch:  Before Dinner:  Before Bedtime:  Other times:  Hypoglycemia:  None      ROS:  Constitutional: No weight loss  Cardiac: No palpitations or racing heart  Resp: No shortness of breath  Neuro: No numbness or tinging in feet  Endo: No heat or cold intolerance, no polyuria or polydipsia  All other systems were reviewed and were negative.    Past Medical History:  Patient Active Problem List    Diagnosis Date Noted   • Paranoia (HCC) 12/12/2018   • Vasculogenic erectile dysfunction 10/09/2018   • Chronic hepatitis C without hepatic coma (HCC) 09/10/2018   • Chronic midline low back pain without sciatica 09/10/2018   • Tremor of right hand 08/13/2018   • Nocturia 08/13/2018   • Essential hypertension 08/13/2018   • Uncontrolled type 2 diabetes mellitus (HCC) 08/13/2018   • Vitamin B12 deficiency 08/13/2018   • Anemia due to vitamin B12 deficiency 08/13/2018       Past Surgical History:  No past surgical history on file.    Allergies:  Patient has no known allergies.    Social History:  Social History     Social History   • Marital status: Single     Spouse name: N/A   • Number of children: N/A   • Years of education: N/A     Occupational History   • Not on file.     Social History Main Topics   • Smoking status: Former Smoker     Packs/day: 0.50     Years: 5.00     Types: Cigarettes     Quit date: 1/1/1978   • Smokeless tobacco: Never  Used   • Alcohol use No   • Drug use: No   • Sexual activity: No     Other Topics Concern   • Not on file     Social History Narrative    Released from custodial in 7/2018, nonviolent offense, volunteering        Family History:  Family History   Problem Relation Age of Onset   • Stroke Mother         aneurysm   • Diabetes Sister    • Asthma Brother    • Diabetes Brother        Medications:    Current Outpatient Prescriptions:   •  ONE TOUCH CLUB LANCETS Misc, 1 Each by Does not apply route every day., Disp: 100 Each, Rfl: 11  •  metFORMIN ER (GLUCOPHAGE XR) 500 MG TABLET SR 24 HR, Take 2 Tabs by mouth every day., Disp: 360 Tab, Rfl: 1  •  Empagliflozin (JARDIANCE) 25 MG Tab, Take 1 Tab by mouth every day., Disp: 90 Tab, Rfl: 1  •  liraglutide (VICTOZA) 18 MG/3ML Solution Pen-injector injection, Inject 0.1 mL as instructed every day., Disp: 6 mL, Rfl: 0  •  glecaprevir-pibrentasvir 100-40 mg tablet, Take  by mouth every day at 6 PM., Disp: , Rfl:   •  Alcohol Swabs, 1 Each by Does not apply route 3 times a day., Disp: 100 Each, Rfl: 2  •  Blood Glucose Monitoring Suppl Device, Meter: Dispense Device of Insurance Preference. Sig. Use as directed for blood sugar monitoring. #1. NR., Disp: 1 Device, Rfl: 0  •  Blood Glucose Test Strips, Test strips order: Test strips for covered meter. Sig: use three times a day and prn ssx high or low sugar #100 RF x 3, Disp: 100 Each, Rfl: 2  •  Lancets, Lancets order: Lancets for provided meter. Sig: use three times daily and prn ssx high or low sugar. #100 RF x 3, Disp: 100 Each, Rfl: 0  •  tamsulosin (FLOMAX) 0.4 MG capsule, Take 1 Cap by mouth ONE-HALF HOUR AFTER BREAKFAST., Disp: 90 Cap, Rfl: 0  •  lisinopril (PRINIVIL, ZESTRIL) 40 MG tablet, Take 1 Tab by mouth every day., Disp: 90 Tab, Rfl: 1  •  atenolol (TENORMIN) 50 MG Tab, Take 1 Tab by mouth 2 times a day., Disp: 180 Tab, Rfl: 0  •  amLODIPine (NORVASC) 10 MG Tab, Take 1 Tab by mouth every day., Disp: 90 Tab, Rfl: 1  •   hydroCHLOROthiazide (HYDRODIURIL) 25 MG Tab, Take 1 Tab by mouth every day., Disp: 90 Tab, Rfl: 0  •  glucosamine 500 MG Cap, Take 1 Cap by mouth every day., Disp: , Rfl:   •  Calcium (OYSTER-BLADE 500 PO), Take 1 Tab by mouth 2 Times a Day., Disp: , Rfl:   •  multivitamin (THERAGRAN) Tab, Take 1 Tab by mouth every day., Disp: , Rfl:   •  BABY ASPIRIN PO, Take 81 mg by mouth every day., Disp: , Rfl:     Labs: Reviewed    Physical Examination:  Vital signs: There were no vitals taken for this visit. There is no height or weight on file to calculate BMI.  General: No apparent distress, cooperative  Eyes: No scleral icterus or discharge  ENMT: Normal on external inspection of nose, lips, normal thyroid exam  Neck: No abnormal masses on inspection  Resp: Normal effort, clear to auscultation bilaterally   CVS: Regular rate and rhythm, S1 S2 normal, no murmur   Extremities: No edema  Abdomen: abdominal obesity present  Neuro: Alert and oriented  Skin: No rash  Psych: Normal mood and affect, intact memory and able to make informed decisions    Assessment and Plan:  Pt is currently off victoza, as he could not tolerate it with his hepatitis treatment  Pt is currently optimized on Jardiance 25mg po daily  Pt is currently optimized on Metformin 1000mg po bid  Pt A1c down to 8.4 from 11.0 last month  Pt is currently meeting his exercise goal of 30 minutes 5-7 days per week.  Return in about 4 weeks (around 4/24/2019).    Thank you for allowing me to participate in the care of this patient.    Jonelle Lagunas  03/27/19    CC:   Alisia Ribeiro M.D.    This note was created using voice recognition software (Dragon). The accuracy of the dictation is limited by the abilities of the software. I have reviewed the note prior to signing, however some errors in grammar and context are still possible. If you have any questions related to this note please do not hesitate to contact our office.

## 2019-04-16 ENCOUNTER — TELEPHONE (OUTPATIENT)
Dept: MEDICAL GROUP | Facility: PHYSICIAN GROUP | Age: 69
End: 2019-04-16

## 2019-04-16 ENCOUNTER — NON-PROVIDER VISIT (OUTPATIENT)
Dept: MEDICAL GROUP | Facility: PHYSICIAN GROUP | Age: 69
End: 2019-04-16
Payer: MEDICARE

## 2019-04-16 VITALS — HEIGHT: 70 IN | WEIGHT: 181 LBS | BODY MASS INDEX: 25.91 KG/M2

## 2019-04-16 DIAGNOSIS — E11.65 UNCONTROLLED TYPE 2 DIABETES MELLITUS WITH HYPERGLYCEMIA (HCC): ICD-10-CM

## 2019-04-16 PROCEDURE — 99211 OFF/OP EST MAY X REQ PHY/QHP: CPT | Performed by: FAMILY MEDICINE

## 2019-04-16 RX ORDER — PEN NEEDLE, DIABETIC 30 GX3/16"
1 NEEDLE, DISPOSABLE MISCELLANEOUS DAILY
Qty: 100 EACH | Refills: 1 | Status: SHIPPED | OUTPATIENT
Start: 2019-04-16

## 2019-04-16 NOTE — TELEPHONE ENCOUNTER
Pt stated that he is going to be finishing his hep c medicine that the GI doctor gave him. He said that he would be needing a new lab test done when he finishes and he would like that to be placed for him.

## 2019-04-16 NOTE — PATIENT INSTRUCTIONS
After finishing antiviral, Restart victoza at 0.3 mg daily for 2 weeks and then increase to 0.6 mg daily until seen again. Return in 1 month for follow up

## 2019-04-16 NOTE — PROGRESS NOTES
RN-NIKOE Note    Subjective:     Health changes since last visit/interval Hx: None    Medications (including changes made today)  Current Outpatient Prescriptions   Medication Sig Dispense Refill   • ONE TOUCH CLUB LANCETS Misc 1 Each by Does not apply route every day. 100 Each 11   • metFORMIN ER (GLUCOPHAGE XR) 500 MG TABLET SR 24 HR Take 2 Tabs by mouth every day. 360 Tab 1   • Empagliflozin (JARDIANCE) 25 MG Tab Take 1 Tab by mouth every day. 90 Tab 1   • glecaprevir-pibrentasvir 100-40 mg tablet Take  by mouth every day at 6 PM.     • Alcohol Swabs 1 Each by Does not apply route 3 times a day. 100 Each 2   • Blood Glucose Monitoring Suppl Device Meter: Dispense Device of Insurance Preference. Sig. Use as directed for blood sugar monitoring. #1. NR. 1 Device 0   • Blood Glucose Test Strips Test strips order: Test strips for covered meter. Sig: use three times a day and prn ssx high or low sugar #100 RF x 3 100 Each 2   • Lancets Lancets order: Lancets for provided meter. Sig: use three times daily and prn ssx high or low sugar. #100 RF x 3 100 Each 0   • tamsulosin (FLOMAX) 0.4 MG capsule Take 1 Cap by mouth ONE-HALF HOUR AFTER BREAKFAST. 90 Cap 0   • lisinopril (PRINIVIL, ZESTRIL) 40 MG tablet Take 1 Tab by mouth every day. 90 Tab 1   • atenolol (TENORMIN) 50 MG Tab Take 1 Tab by mouth 2 times a day. 180 Tab 0   • amLODIPine (NORVASC) 10 MG Tab Take 1 Tab by mouth every day. 90 Tab 1   • hydroCHLOROthiazide (HYDRODIURIL) 25 MG Tab Take 1 Tab by mouth every day. 90 Tab 0   • glucosamine 500 MG Cap Take 1 Cap by mouth every day.     • Calcium (OYSTER-BLADE 500 PO) Take 1 Tab by mouth 2 Times a Day.     • multivitamin (THERAGRAN) Tab Take 1 Tab by mouth every day.     • BABY ASPIRIN PO Take 81 mg by mouth every day.     • liraglutide (VICTOZA) 18 MG/3ML Solution Pen-injector injection Inject 0.1 mL as instructed every day. (Patient not taking: Reported on 4/16/2019) 6 mL 0     No current facility-administered  "medications for this visit.        Taking daily ASA: Yes  Taking above medications as prescribed: yes  SIDE EFFECTS: Patient denies side effects to medications    Exercise: sporadic irregular exercise, <half hour walking weekly  Diet: \"healthy\" diet  in general  Patient's body mass index is 25.97 kg/m². Exercise and nutrition counseling were performed at this visit.      Health Maintenance:   Health Maintenance Due   Topic Date Due   • Annual Wellness Visit  1950   • RETINAL SCREENING  12/24/1967   • IMM HEP B VACCINE (1 of 3 - Risk 3-dose series) 12/24/1968   • IMM ZOSTER VACCINES (1 of 2) 12/24/1999       Immunizations:   PPSV23: Up-to-date  Hhculbo86: Up-to-date  Tdap: Up-to-date  Flu: Up-to-date  Hep B: Up-to-date    DM:   Last A1c:   Lab Results   Component Value Date/Time    HBA1C 8.4 (A) 03/27/2019 03:21 PM      A1C GOAL: < 7    Glucose monitoring frequency: daily  134 this am  Hypoglycemic episodes: no    Last Retinal Exam: on file and up-to-date  Daily Foot Exam: Yes   Routine Dental Exams: No    Lab Results   Component Value Date/Time    MALBCRT 16 08/20/2018 08:58 AM    MICROALBUR 5.9 08/20/2018 08:58 AM        ACR Albumin/Creatinine Ratio goal <30     HTN:   Blood pressure goal <140/<80 .   Currently Rx ACE/ARB: Yes    Dyslipidemia:    Lab Results   Component Value Date/Time    CHOLSTRLTOT 189 08/20/2018 08:58 AM     (H) 08/20/2018 08:58 AM    HDL 38 (A) 08/20/2018 08:58 AM    TRIGLYCERIDE 147 08/20/2018 08:58 AM       Lab Results   Component Value Date/Time    SODIUM 136 02/07/2019 11:36 AM    POTASSIUM 4.0 02/07/2019 11:36 AM    CHLORIDE 101 02/07/2019 11:36 AM    CO2 23 02/07/2019 11:36 AM    GLUCOSE 115 (H) 02/07/2019 11:36 AM    BUN 35 (H) 02/07/2019 11:36 AM    CREATININE 1.57 (H) 02/07/2019 11:36 AM     Lab Results   Component Value Date/Time    ALKPHOSPHAT 49 02/07/2019 11:36 AM    ASTSGOT 32 02/07/2019 11:36 AM    ALTSGPT 38 02/07/2019 11:36 AM    TBILIRUBIN 1.0 02/07/2019 11:36 AM "        Currently Rx Statin: No    He  reports that he quit smoking about 41 years ago. His smoking use included Cigarettes. He has a 2.50 pack-year smoking history. He has never used smokeless tobacco.    Objective:     Exam:  Monofilament: done   Monofilament testing with a 10 gram force: sensation intact: intact bilaterally  Visual Inspection: Feet without maceration, ulcers, fissures.  Pedal pulses: intact bilaterally    Plan:     Discussed and educated on:   - All medications, side effects and compliance (discussed carefully)  - Annual eye examinations at Ophthalmology  - Factors Affecting Blood Glucose Control: illness  - Home glucose monitoring emphasized  - Weight control and daily exercise    Recommended medication changes: Pt was taken off victoza while on his antiviral patient will resume victoza 0.3 mg daily for 2 weeks and then increase to 0.6 mg until seen again

## 2019-04-22 ENCOUNTER — HOSPITAL ENCOUNTER (OUTPATIENT)
Dept: LAB | Facility: MEDICAL CENTER | Age: 69
End: 2019-04-22
Attending: INTERNAL MEDICINE
Payer: MEDICARE

## 2019-04-22 ENCOUNTER — NON-PROVIDER VISIT (OUTPATIENT)
Dept: MEDICAL GROUP | Facility: PHYSICIAN GROUP | Age: 69
End: 2019-04-22
Payer: MEDICARE

## 2019-04-22 DIAGNOSIS — E53.8 B12 DEFICIENCY: ICD-10-CM

## 2019-04-22 LAB
ALBUMIN SERPL BCP-MCNC: 4.5 G/DL (ref 3.2–4.9)
ALBUMIN/GLOB SERPL: 1.2 G/DL
ALP SERPL-CCNC: 90 U/L (ref 30–99)
ALT SERPL-CCNC: 13 U/L (ref 2–50)
ANION GAP SERPL CALC-SCNC: 14 MMOL/L (ref 0–11.9)
AST SERPL-CCNC: 19 U/L (ref 12–45)
BASOPHILS # BLD AUTO: 0.8 % (ref 0–1.8)
BASOPHILS # BLD: 0.08 K/UL (ref 0–0.12)
BILIRUB SERPL-MCNC: 0.5 MG/DL (ref 0.1–1.5)
BUN SERPL-MCNC: 17 MG/DL (ref 8–22)
CALCIUM SERPL-MCNC: 10.3 MG/DL (ref 8.5–10.5)
CHLORIDE SERPL-SCNC: 104 MMOL/L (ref 96–112)
CO2 SERPL-SCNC: 23 MMOL/L (ref 20–33)
CREAT SERPL-MCNC: 1.21 MG/DL (ref 0.5–1.4)
EOSINOPHIL # BLD AUTO: 0.12 K/UL (ref 0–0.51)
EOSINOPHIL NFR BLD: 1.2 % (ref 0–6.9)
ERYTHROCYTE [DISTWIDTH] IN BLOOD BY AUTOMATED COUNT: 44.1 FL (ref 35.9–50)
GLOBULIN SER CALC-MCNC: 3.9 G/DL (ref 1.9–3.5)
GLUCOSE SERPL-MCNC: 156 MG/DL (ref 65–99)
HCT VFR BLD AUTO: 44.2 % (ref 42–52)
HGB BLD-MCNC: 14.3 G/DL (ref 14–18)
IMM GRANULOCYTES # BLD AUTO: 0.03 K/UL (ref 0–0.11)
IMM GRANULOCYTES NFR BLD AUTO: 0.3 % (ref 0–0.9)
LYMPHOCYTES # BLD AUTO: 2.99 K/UL (ref 1–4.8)
LYMPHOCYTES NFR BLD: 29.6 % (ref 22–41)
MCH RBC QN AUTO: 27.1 PG (ref 27–33)
MCHC RBC AUTO-ENTMCNC: 32.4 G/DL (ref 33.7–35.3)
MCV RBC AUTO: 83.7 FL (ref 81.4–97.8)
MONOCYTES # BLD AUTO: 1.06 K/UL (ref 0–0.85)
MONOCYTES NFR BLD AUTO: 10.5 % (ref 0–13.4)
NEUTROPHILS # BLD AUTO: 5.83 K/UL (ref 1.82–7.42)
NEUTROPHILS NFR BLD: 57.6 % (ref 44–72)
NRBC # BLD AUTO: 0 K/UL
NRBC BLD-RTO: 0 /100 WBC
PLATELET # BLD AUTO: 239 K/UL (ref 164–446)
PMV BLD AUTO: 12.2 FL (ref 9–12.9)
POTASSIUM SERPL-SCNC: 3.9 MMOL/L (ref 3.6–5.5)
PROT SERPL-MCNC: 8.4 G/DL (ref 6–8.2)
RBC # BLD AUTO: 5.28 M/UL (ref 4.7–6.1)
SODIUM SERPL-SCNC: 141 MMOL/L (ref 135–145)
WBC # BLD AUTO: 10.1 K/UL (ref 4.8–10.8)

## 2019-04-22 PROCEDURE — 36415 COLL VENOUS BLD VENIPUNCTURE: CPT

## 2019-04-22 PROCEDURE — 85025 COMPLETE CBC W/AUTO DIFF WBC: CPT

## 2019-04-22 PROCEDURE — 87522 HEPATITIS C REVRS TRNSCRPJ: CPT

## 2019-04-22 PROCEDURE — 80053 COMPREHEN METABOLIC PANEL: CPT

## 2019-04-22 RX ORDER — CYANOCOBALAMIN 1000 UG/ML
1000 INJECTION, SOLUTION INTRAMUSCULAR; SUBCUTANEOUS ONCE
Status: COMPLETED | OUTPATIENT
Start: 2019-04-22 | End: 2019-04-22

## 2019-04-22 RX ADMIN — CYANOCOBALAMIN 1000 MCG: 1000 INJECTION, SOLUTION INTRAMUSCULAR; SUBCUTANEOUS at 09:00

## 2019-04-22 NOTE — PROGRESS NOTES
Chuck Bullard is a 68 y.o. male here for a non-provider visit for B12 injection.    Reason for injection: B12 defiency  Order in MAR?: No  Patient supplied?:No  Minimum interval has been met for this injection (per MAR order): Yes    Order and dose verified by: Madeline Thrasher  Patient tolerated injection and no adverse effects were observed or reported: Yes    # of Administrations remaining in MAR: 0

## 2019-04-24 ENCOUNTER — NON-PROVIDER VISIT (OUTPATIENT)
Dept: MEDICAL GROUP | Facility: PHYSICIAN GROUP | Age: 69
End: 2019-04-24
Payer: MEDICARE

## 2019-04-24 VITALS — BODY MASS INDEX: 25.04 KG/M2 | WEIGHT: 174.5 LBS

## 2019-04-24 DIAGNOSIS — E11.9 TYPE 2 DIABETES MELLITUS WITHOUT COMPLICATION, WITHOUT LONG-TERM CURRENT USE OF INSULIN (HCC): ICD-10-CM

## 2019-04-24 LAB — GLUCOSE BLD-MCNC: 138 MG/DL (ref 70–100)

## 2019-04-24 PROCEDURE — 82962 GLUCOSE BLOOD TEST: CPT | Performed by: FAMILY MEDICINE

## 2019-04-24 PROCEDURE — 99999 POCT GLUCOSE: CPT | Performed by: FAMILY MEDICINE

## 2019-04-24 PROCEDURE — 99402 PREV MED CNSL INDIV APPRX 30: CPT | Performed by: FAMILY MEDICINE

## 2019-04-24 RX ORDER — METFORMIN HYDROCHLORIDE 500 MG/1
1000 TABLET, EXTENDED RELEASE ORAL 2 TIMES DAILY
Qty: 360 TAB | Refills: 4 | Status: SHIPPED | OUTPATIENT
Start: 2019-04-24

## 2019-04-24 NOTE — NON-PROVIDER
New Patient Consult Note  Primary care physician: Alisia Ribeiro M.D.  Start time:2:40  End time:3:15    Reason for consult: Management of Uncontrolled Type 2 Diabetes    HPI:  Chuck Bullard is a 68 y.o. old patient who comes in today for evaluation of above stated problem.    Most Recent HbA1c:   Lab Results   Component Value Date/Time    HBA1C 8.4 (A) 03/27/2019 03:21 PM      FSBG 138  Current Diabetes Regimen:    SGLT-2 Inhibitor:  Empagliflozin 25 mg once daily   Metformin 1000mg po bid    Before Breakfast:130s-140s  Before Lunch:  Before Dinner:  Before Bedtime:  Other times:  Hypoglycemia:  None      ROS:  Constitutional: No weight loss  Cardiac: No palpitations or racing heart  Resp: No shortness of breath  Neuro: No numbness or tinging in feet  Endo: No heat or cold intolerance, no polyuria or polydipsia  All other systems were reviewed and were negative.    Past Medical History:  Patient Active Problem List    Diagnosis Date Noted   • Paranoia (HCC) 12/12/2018   • Vasculogenic erectile dysfunction 10/09/2018   • Chronic hepatitis C without hepatic coma (HCC) 09/10/2018   • Chronic midline low back pain without sciatica 09/10/2018   • Tremor of right hand 08/13/2018   • Nocturia 08/13/2018   • Essential hypertension 08/13/2018   • Uncontrolled type 2 diabetes mellitus (HCC) 08/13/2018   • Vitamin B12 deficiency 08/13/2018   • Anemia due to vitamin B12 deficiency 08/13/2018       Past Surgical History:  No past surgical history on file.    Allergies:  Patient has no known allergies.    Social History:  Social History     Social History   • Marital status: Single     Spouse name: N/A   • Number of children: N/A   • Years of education: N/A     Occupational History   • Not on file.     Social History Main Topics   • Smoking status: Former Smoker     Packs/day: 0.50     Years: 5.00     Types: Cigarettes     Quit date: 1/1/1978   • Smokeless tobacco: Never Used   • Alcohol use No   • Drug use: No   • Sexual  activity: No     Other Topics Concern   • Not on file     Social History Narrative    Released from assisted in 7/2018, nonviolent offense, volunteering        Family History:  Family History   Problem Relation Age of Onset   • Stroke Mother         aneurysm   • Diabetes Sister    • Asthma Brother    • Diabetes Brother        Medications:    Current Outpatient Prescriptions:   •  metFORMIN ER (GLUCOPHAGE XR) 500 MG TABLET SR 24 HR, Take 2 Tabs by mouth 2 times a day., Disp: 360 Tab, Rfl: 4  •  liraglutide (VICTOZA) 18 MG/3ML Solution Pen-injector injection, Inject 0.1 mL as instructed every day., Disp: 6 mL, Rfl: 0  •  Insulin Pen Needle (PEN NEEDLES) 32G X 4 MM Misc, 1 Each by Does not apply route every day., Disp: 100 Each, Rfl: 1  •  ONE TOUCH CLUB LANCETS Misc, 1 Each by Does not apply route every day., Disp: 100 Each, Rfl: 11  •  Empagliflozin (JARDIANCE) 25 MG Tab, Take 1 Tab by mouth every day., Disp: 90 Tab, Rfl: 1  •  glecaprevir-pibrentasvir 100-40 mg tablet, Take  by mouth every day at 6 PM., Disp: , Rfl:   •  Alcohol Swabs, 1 Each by Does not apply route 3 times a day., Disp: 100 Each, Rfl: 2  •  Blood Glucose Monitoring Suppl Device, Meter: Dispense Device of Insurance Preference. Sig. Use as directed for blood sugar monitoring. #1. NR., Disp: 1 Device, Rfl: 0  •  Blood Glucose Test Strips, Test strips order: Test strips for covered meter. Sig: use three times a day and prn ssx high or low sugar #100 RF x 3, Disp: 100 Each, Rfl: 2  •  Lancets, Lancets order: Lancets for provided meter. Sig: use three times daily and prn ssx high or low sugar. #100 RF x 3, Disp: 100 Each, Rfl: 0  •  tamsulosin (FLOMAX) 0.4 MG capsule, Take 1 Cap by mouth ONE-HALF HOUR AFTER BREAKFAST., Disp: 90 Cap, Rfl: 0  •  lisinopril (PRINIVIL, ZESTRIL) 40 MG tablet, Take 1 Tab by mouth every day., Disp: 90 Tab, Rfl: 1  •  atenolol (TENORMIN) 50 MG Tab, Take 1 Tab by mouth 2 times a day., Disp: 180 Tab, Rfl: 0  •  amLODIPine (NORVASC)  10 MG Tab, Take 1 Tab by mouth every day., Disp: 90 Tab, Rfl: 1  •  hydroCHLOROthiazide (HYDRODIURIL) 25 MG Tab, Take 1 Tab by mouth every day., Disp: 90 Tab, Rfl: 0  •  glucosamine 500 MG Cap, Take 1 Cap by mouth every day., Disp: , Rfl:   •  Calcium (OYSTER-BLADE 500 PO), Take 1 Tab by mouth 2 Times a Day., Disp: , Rfl:   •  multivitamin (THERAGRAN) Tab, Take 1 Tab by mouth every day., Disp: , Rfl:   •  BABY ASPIRIN PO, Take 81 mg by mouth every day., Disp: , Rfl:     Labs: Reviewed    Physical Examination:  Vital signs: Wt 79.2 kg (174 lb 8 oz)   BMI 25.04 kg/m²  Body mass index is 25.04 kg/m².  General: No apparent distress, cooperative  Eyes: No scleral icterus or discharge  ENMT: Normal on external inspection of nose, lips, normal thyroid exam  Neck: No abnormal masses on inspection  Resp: Normal effort, clear to auscultation bilaterally   CVS: Regular rate and rhythm, S1 S2 normal, no murmur   Extremities: No edema  Abdomen: abdominal obesity present  Neuro: Alert and oriented  Skin: No rash  Psych: Normal mood and affect, intact memory and able to make informed decisions    Assessment and Plan:    Pt has been holding victoza while taking his Hepatitis  C treatment.  Pt has completed this and would like to resume this now.  Will start at 0.6mg sub q daily  Pt is currently optimized on Jardiance 25mg po daily  Pt is currently optimized on Metfromin 1000mg po bid.  Pt has not been able to exercise as much as he would like to as he hurt his left knee.  Pt currently follows the plate method, and is doing well.  He would acutally like to gain a bit of weight.      Return in about 4 weeks (around 5/22/2019).    Thank you for allowing me to participate in the care of this patient.    Jonelle Lagunas  04/24/19    CC:   Alisia Ribeiro M.D.    This note was created using voice recognition software (Dragon). The accuracy of the dictation is limited by the abilities of the software. I have reviewed the note prior to  signing, however some errors in grammar and context are still possible. If you have any questions related to this note please do not hesitate to contact our office.

## 2019-04-25 LAB
HCV RNA SERPL NAA+PROBE-ACNC: NOT DETECTED IU/ML
HCV RNA SERPL NAA+PROBE-LOG IU: NOT DETECTED LOG IU/ML
HCV RNA SERPL QL NAA+PROBE: NOT DETECTED

## 2019-04-27 ENCOUNTER — PATIENT MESSAGE (OUTPATIENT)
Dept: MEDICAL GROUP | Facility: PHYSICIAN GROUP | Age: 69
End: 2019-04-27

## 2019-05-22 ENCOUNTER — NON-PROVIDER VISIT (OUTPATIENT)
Dept: MEDICAL GROUP | Facility: PHYSICIAN GROUP | Age: 69
End: 2019-05-22
Payer: MEDICARE

## 2019-05-22 VITALS
SYSTOLIC BLOOD PRESSURE: 122 MMHG | BODY MASS INDEX: 24.42 KG/M2 | OXYGEN SATURATION: 93 % | HEART RATE: 85 BPM | WEIGHT: 170.2 LBS | DIASTOLIC BLOOD PRESSURE: 85 MMHG

## 2019-05-22 DIAGNOSIS — E11.9 TYPE 2 DIABETES MELLITUS WITHOUT COMPLICATION, WITHOUT LONG-TERM CURRENT USE OF INSULIN (HCC): ICD-10-CM

## 2019-05-22 LAB — GLUCOSE BLD-MCNC: 89 MG/DL (ref 70–100)

## 2019-05-22 PROCEDURE — 99402 PREV MED CNSL INDIV APPRX 30: CPT | Performed by: FAMILY MEDICINE

## 2019-05-22 PROCEDURE — 82962 GLUCOSE BLOOD TEST: CPT | Performed by: FAMILY MEDICINE

## 2019-05-22 PROCEDURE — 99999 POCT GLUCOSE: CPT | Performed by: INTERNAL MEDICINE

## 2019-05-22 NOTE — NON-PROVIDER
New Patient Consult Note  Primary care physician: Alisia Ribeiro M.D.  Start time: 2:49  End time: 3:23    Reason for consult: Management of Uncontrolled Type 2 Diabetes    HPI:  Chuck Bullard is a 68 y.o. old patient who comes in today for evaluation of above stated problem.    Most Recent HbA1c:   Lab Results   Component Value Date/Time    HBA1C 8.4 (A) 03/27/2019 03:21 PM      FSBG in office 85    Current Diabetes Regimen:  GLP-1 Agent: Liraglutide 0.6 mg sc once daily   SGLT-2 Inhibitor:  Empagliflozin 25 mg once daily   Metformin 1000mg po bid      Before Breakfast:  Before Lunch:  Before Dinner:  Before Bedtime:  Other times:  Hypoglycemia:  none      ROS:  Constitutional: 4 lbs weight loss in the past 4 weeks   Cardiac: No palpitations or racing heart  Resp: No shortness of breath  Neuro: No numbness or tinging in feet  Endo: No heat or cold intolerance, no polyuria or polydipsia  All other systems were reviewed and were negative.    Past Medical History:  Patient Active Problem List    Diagnosis Date Noted   • Paranoia (HCC) 12/12/2018   • Vasculogenic erectile dysfunction 10/09/2018   • Chronic hepatitis C without hepatic coma (HCC) 09/10/2018   • Chronic midline low back pain without sciatica 09/10/2018   • Tremor of right hand 08/13/2018   • Nocturia 08/13/2018   • Essential hypertension 08/13/2018   • Uncontrolled type 2 diabetes mellitus (HCC) 08/13/2018   • Vitamin B12 deficiency 08/13/2018   • Anemia due to vitamin B12 deficiency 08/13/2018       Past Surgical History:  No past surgical history on file.    Allergies:  Patient has no known allergies.    Social History:  Social History     Social History   • Marital status: Single     Spouse name: N/A   • Number of children: N/A   • Years of education: N/A     Occupational History   • Not on file.     Social History Main Topics   • Smoking status: Former Smoker     Packs/day: 0.50     Years: 5.00     Types: Cigarettes     Quit date: 1/1/1978   •  Smokeless tobacco: Never Used   • Alcohol use No   • Drug use: No   • Sexual activity: No     Other Topics Concern   • Not on file     Social History Narrative    Released from [employer] in 7/2018, nonviolent offense, volunteering        Family History:  Family History   Problem Relation Age of Onset   • Stroke Mother         aneurysm   • Diabetes Sister    • Asthma Brother    • Diabetes Brother        Medications:    Current Outpatient Prescriptions:   •  metFORMIN ER (GLUCOPHAGE XR) 500 MG TABLET SR 24 HR, Take 2 Tabs by mouth 2 times a day., Disp: 360 Tab, Rfl: 4  •  liraglutide (VICTOZA) 18 MG/3ML Solution Pen-injector injection, Inject 0.1 mL as instructed every day., Disp: 6 mL, Rfl: 0  •  Insulin Pen Needle (PEN NEEDLES) 32G X 4 MM Misc, 1 Each by Does not apply route every day., Disp: 100 Each, Rfl: 1  •  ONE TOUCH CLUB LANCETS Misc, 1 Each by Does not apply route every day., Disp: 100 Each, Rfl: 11  •  Empagliflozin (JARDIANCE) 25 MG Tab, Take 1 Tab by mouth every day., Disp: 90 Tab, Rfl: 1  •  glecaprevir-pibrentasvir 100-40 mg tablet, Take  by mouth every day at 6 PM., Disp: , Rfl:   •  Alcohol Swabs, 1 Each by Does not apply route 3 times a day., Disp: 100 Each, Rfl: 2  •  Blood Glucose Monitoring Suppl Device, Meter: Dispense Device of Insurance Preference. Sig. Use as directed for blood sugar monitoring. #1. NR., Disp: 1 Device, Rfl: 0  •  Blood Glucose Test Strips, Test strips order: Test strips for covered meter. Sig: use three times a day and prn ssx high or low sugar #100 RF x 3, Disp: 100 Each, Rfl: 2  •  Lancets, Lancets order: Lancets for provided meter. Sig: use three times daily and prn ssx high or low sugar. #100 RF x 3, Disp: 100 Each, Rfl: 0  •  tamsulosin (FLOMAX) 0.4 MG capsule, Take 1 Cap by mouth ONE-HALF HOUR AFTER BREAKFAST., Disp: 90 Cap, Rfl: 0  •  lisinopril (PRINIVIL, ZESTRIL) 40 MG tablet, Take 1 Tab by mouth every day., Disp: 90 Tab, Rfl: 1  •  atenolol (TENORMIN) 50 MG Tab, Take 1  Tab by mouth 2 times a day., Disp: 180 Tab, Rfl: 0  •  amLODIPine (NORVASC) 10 MG Tab, Take 1 Tab by mouth every day., Disp: 90 Tab, Rfl: 1  •  hydroCHLOROthiazide (HYDRODIURIL) 25 MG Tab, Take 1 Tab by mouth every day., Disp: 90 Tab, Rfl: 0  •  glucosamine 500 MG Cap, Take 1 Cap by mouth every day., Disp: , Rfl:   •  Calcium (OYSTER-BLADE 500 PO), Take 1 Tab by mouth 2 Times a Day., Disp: , Rfl:   •  multivitamin (THERAGRAN) Tab, Take 1 Tab by mouth every day., Disp: , Rfl:   •  BABY ASPIRIN PO, Take 81 mg by mouth every day., Disp: , Rfl:     Labs: Reviewed    Physical Examination:  Vital signs: /85   Pulse 85   Wt 77.2 kg (170 lb 3.2 oz)   SpO2 93%   BMI 24.42 kg/m²  Body mass index is 24.42 kg/m².  General: No apparent distress, cooperative  Eyes: No scleral icterus or discharge  ENMT: Normal on external inspection of nose, lips, normal thyroid exam  Neck: No abnormal masses on inspection  Resp: Normal effort, clear to auscultation bilaterally   CVS: Regular rate and rhythm, S1 S2 normal, no murmur   Extremities: No edema  Abdomen: abdominal obesity present  Neuro: Alert and oriented  Skin: No rash  Psych: Normal mood and affect, intact memory and able to make informed decisions    Assessment and Plan:    Pt is tolerating Victoza 0.6mg sub q daily.  Pt is concerned about weight loss, and does not wish to lose any more.  Will continue at this dose  Pt is currently optimized on Jardiance 25mg po daily  Pt is currently optimized on Metformin.  Pt is currently meeting his exercise goal of ~7 miles daily walking  Pt is currently following the plate method, however he is losing weight, and does not wish to lose any more.  Recommend increasing his protein intake to facilitate muscle gain.  Pt is currently doing strengthening exercises daily, push ups and sit ups.      Return in about 2 months (around 7/22/2019).    Thank you for allowing me to participate in the care of this patient.    Jonelle PENA  Filter  05/22/19    CC:   Alisia Ribeiro M.D.    This note was created using voice recognition software (Dragon). The accuracy of the dictation is limited by the abilities of the software. I have reviewed the note prior to signing, however some errors in grammar and context are still possible. If you have any questions related to this note please do not hesitate to contact our office.

## 2019-05-23 DIAGNOSIS — I10 ESSENTIAL HYPERTENSION: ICD-10-CM

## 2019-05-24 RX ORDER — HYDROCHLOROTHIAZIDE 25 MG/1
25 TABLET ORAL DAILY
Qty: 90 TAB | Refills: 0 | Status: SHIPPED | OUTPATIENT
Start: 2019-05-24

## 2019-05-25 NOTE — TELEPHONE ENCOUNTER
The patient's chart was reviewed and he is overdue for follow up.  I have refilled his prescription for 90 days.  Please ask him to schedule follow up before his next refills of this medication are due are due.

## 2019-05-30 ENCOUNTER — NON-PROVIDER VISIT (OUTPATIENT)
Dept: MEDICAL GROUP | Facility: PHYSICIAN GROUP | Age: 69
End: 2019-05-30
Payer: MEDICARE

## 2019-05-30 DIAGNOSIS — E53.8 B12 DEFICIENCY: ICD-10-CM

## 2019-05-30 RX ORDER — CYANOCOBALAMIN 1000 UG/ML
1000 INJECTION, SOLUTION INTRAMUSCULAR; SUBCUTANEOUS ONCE
Status: COMPLETED | OUTPATIENT
Start: 2019-05-30 | End: 2019-05-30

## 2019-05-30 RX ADMIN — CYANOCOBALAMIN 1000 MCG: 1000 INJECTION, SOLUTION INTRAMUSCULAR; SUBCUTANEOUS at 15:05

## 2019-06-03 DIAGNOSIS — I10 ESSENTIAL HYPERTENSION: ICD-10-CM

## 2019-06-03 NOTE — TELEPHONE ENCOUNTER
Was the patient seen in the last year in this department? Yes    Does patient have an active prescription for medications requested? Yes    Received Request Via: Pharmacy     Last Visit:01/31/2019  Last Lab:04/22/2019

## 2019-06-04 RX ORDER — LISINOPRIL 40 MG/1
TABLET ORAL
Qty: 90 TAB | Refills: 0 | Status: SHIPPED | OUTPATIENT
Start: 2019-06-04

## 2019-06-07 DIAGNOSIS — I10 ESSENTIAL HYPERTENSION: ICD-10-CM

## 2019-06-07 RX ORDER — AMLODIPINE BESYLATE 10 MG/1
TABLET ORAL
Qty: 90 TAB | Refills: 0 | Status: SHIPPED | OUTPATIENT
Start: 2019-06-07

## 2019-06-17 ENCOUNTER — TELEPHONE (OUTPATIENT)
Dept: MEDICAL GROUP | Facility: PHYSICIAN GROUP | Age: 69
End: 2019-06-17

## 2019-06-17 DIAGNOSIS — R06.6 INTRACTABLE HICCUPS: ICD-10-CM

## 2019-06-17 NOTE — TELEPHONE ENCOUNTER
Pt called stating that he has been getting bad hiccups that wake him up at night. He stated that the only way he can get rid of them is by making himself gag a few times. He did not complain of any heartburn or stomach pain. He just stated that he will burp and then the hiccups will come and he is unable to get a really good breath because they don't stop. He would like to know what he should do.

## 2019-06-18 RX ORDER — OMEPRAZOLE 20 MG/1
20 CAPSULE, DELAYED RELEASE ORAL DAILY
Qty: 30 CAP | Refills: 0 | Status: SHIPPED | OUTPATIENT
Start: 2019-06-18

## 2019-06-18 NOTE — TELEPHONE ENCOUNTER
Notified patient. He stated that he will try the medication and call to schedule an appointment at a later date.

## 2019-06-18 NOTE — TELEPHONE ENCOUNTER
He can try an acid medication to start with, omeprazole, esomeprazole which are available over the counter are reasonable but I have also prescribed it.  If that does not help the symptoms I will need to see him to discuss.  He is also due for routine follow up in th e next month.  Thanks

## 2019-06-27 ENCOUNTER — NON-PROVIDER VISIT (OUTPATIENT)
Dept: MEDICAL GROUP | Facility: PHYSICIAN GROUP | Age: 69
End: 2019-06-27
Payer: MEDICARE

## 2019-06-27 DIAGNOSIS — R79.89 LOW VITAMIN B12 LEVEL: ICD-10-CM

## 2019-06-27 RX ORDER — CYANOCOBALAMIN 1000 UG/ML
1000 INJECTION, SOLUTION INTRAMUSCULAR; SUBCUTANEOUS ONCE
OUTPATIENT
Start: 2019-06-27 | End: 2019-06-28

## 2019-07-02 ENCOUNTER — OFFICE VISIT (OUTPATIENT)
Dept: MEDICAL GROUP | Facility: PHYSICIAN GROUP | Age: 69
End: 2019-07-02
Payer: MEDICARE

## 2019-07-02 VITALS
BODY MASS INDEX: 23.77 KG/M2 | RESPIRATION RATE: 12 BRPM | HEIGHT: 70 IN | TEMPERATURE: 98 F | HEART RATE: 95 BPM | OXYGEN SATURATION: 96 % | DIASTOLIC BLOOD PRESSURE: 98 MMHG | WEIGHT: 166 LBS | SYSTOLIC BLOOD PRESSURE: 142 MMHG

## 2019-07-02 DIAGNOSIS — E11.65 UNCONTROLLED TYPE 2 DIABETES MELLITUS WITH HYPERGLYCEMIA (HCC): ICD-10-CM

## 2019-07-02 DIAGNOSIS — R13.19 ESOPHAGEAL DYSPHAGIA: ICD-10-CM

## 2019-07-02 DIAGNOSIS — I10 ESSENTIAL HYPERTENSION: ICD-10-CM

## 2019-07-02 DIAGNOSIS — B18.2 CHRONIC HEPATITIS C WITHOUT HEPATIC COMA (HCC): ICD-10-CM

## 2019-07-02 PROCEDURE — 99214 OFFICE O/P EST MOD 30 MIN: CPT | Performed by: FAMILY MEDICINE

## 2019-07-02 RX ORDER — LANCETS 30 GAUGE
EACH MISCELLANEOUS
Qty: 100 EACH | Refills: 0 | Status: SHIPPED | OUTPATIENT
Start: 2019-07-02

## 2019-07-02 RX ORDER — SUCRALFATE 1 G/1
1 TABLET ORAL
Qty: 120 TAB | Refills: 3 | Status: SHIPPED | OUTPATIENT
Start: 2019-07-02

## 2019-07-02 ASSESSMENT — ENCOUNTER SYMPTOMS
BRUISES/BLEEDS EASILY: 0
HEARTBURN: 1
EYES NEGATIVE: 1
CHILLS: 0
CONSTITUTIONAL NEGATIVE: 1
PALPITATIONS: 0
MUSCULOSKELETAL NEGATIVE: 1
FEVER: 0
TINGLING: 0
NEUROLOGICAL NEGATIVE: 1
CARDIOVASCULAR NEGATIVE: 1
RESPIRATORY NEGATIVE: 1
MYALGIAS: 0
COUGH: 0
DIZZINESS: 0
BLURRED VISION: 0
HEADACHES: 0
PSYCHIATRIC NEGATIVE: 1
DEPRESSION: 0
HEMOPTYSIS: 0
NAUSEA: 0
DOUBLE VISION: 0

## 2019-07-02 NOTE — PROGRESS NOTES
Subjective:      Chuck Bullard is a 68 y.o. male who presents with Hiccups            1. Esophageal dysphagia  Several month history of pain in the chest after eating and sensation of food getting stuck  Also with belching and hiccups after eating and drinking relieved by gagging    - sucralfate (CARAFATE) 1 GM Tab; Take 1 Tab by mouth 4 Times a Day,Before Meals and at Bedtime.  Dispense: 120 Tab; Refill: 3  - REFERRAL TO GASTROENTEROLOGY    2. Essential hypertension  Currently treated for HTN, taking meds with no CP or sob, monitors bp at home periodically. controlled      3. Chronic hepatitis C without hepatic coma (HCC)  To see gi for f/u    4. Uncontrolled type 2 diabetes mellitus with hyperglycemia (HCC)  Currently treated for DM, taking meds and checking bs at home, trying to do DM diet.controlled    - Blood Glucose Test Strips; Test strips order: Test strips for covered meter. Sig: use three times a day and prn ssx high or low sugar #100 RF x 3  Dispense: 100 Each; Refill: 2  - Lancets; Lancets order: Lancets for provided meter. Sig: use three times daily and prn ssx high or low sugar. #100 RF x 3  Dispense: 100 Each; Refill: 0    No past medical history on file.  No past surgical history on file.  Smoking status: Former Smoker                                                              Packs/day: 0.50      Years: 5.00         Types: Cigarettes     Quit date: 1/1/1978  Smokeless tobacco: Never Used                      Alcohol use: No                Review of patient's family history indicates:  Problem: Stroke      Relation: Mother       Age of Onset: (Not Specified)       Comment: aneurysm  Problem: Diabetes      Relation: Sister       Age of Onset: (Not Specified)   Problem: Asthma      Relation: Brother       Age of Onset: (Not Specified)   Problem: Diabetes      Relation: Brother       Age of Onset: (Not Specified)       Current Outpatient Prescriptions: •  sucralfate (CARAFATE) 1 GM Tab, Take 1 Tab by  mouth 4 Times a Day,Before Meals and at Bedtime., Disp: 120 Tab, Rfl: 3•  Blood Glucose Test Strips, Test strips order: Test strips for covered meter. Sig: use three times a day and prn ssx high or low sugar #100 RF x 3, Disp: 100 Each, Rfl: 2•  Lancets, Lancets order: Lancets for provided meter. Sig: use three times daily and prn ssx high or low sugar. #100 RF x 3, Disp: 100 Each, Rfl: 0•  omeprazole (PRILOSEC) 20 MG delayed-release capsule, Take 1 Cap by mouth every day., Disp: 30 Cap, Rfl: 0•  amLODIPine (NORVASC) 10 MG Tab, TAKE ONE TABLET BY MOUTH ONE TIME DAILY, Disp: 90 Tab, Rfl: 0•  lisinopril (PRINIVIL, ZESTRIL) 40 MG tablet, TAKE ONE TABLET BY MOUTH ONE TIME DAILY, Disp: 90 Tab, Rfl: 0•  VICTOZA 18 MG/3ML Solution Pen-injector injection, INJECT 0.6MG SUBCUTANEOUSLY DAILY AS INSTRUCTED, Disp: 6 mL, Rfl: 0•  hydroCHLOROthiazide (HYDRODIURIL) 25 MG Tab, Take 1 Tab by mouth every day., Disp: 90 Tab, Rfl: 0•  metFORMIN ER (GLUCOPHAGE XR) 500 MG TABLET SR 24 HR, Take 2 Tabs by mouth 2 times a day., Disp: 360 Tab, Rfl: 4•  Insulin Pen Needle (PEN NEEDLES) 32G X 4 MM Misc, 1 Each by Does not apply route every day., Disp: 100 Each, Rfl: 1•  ONE TOUCH CLUB LANCETS Misc, 1 Each by Does not apply route every day., Disp: 100 Each, Rfl: 11•  Empagliflozin (JARDIANCE) 25 MG Tab, Take 1 Tab by mouth every day., Disp: 90 Tab, Rfl: 1•  glecaprevir-pibrentasvir 100-40 mg tablet, Take  by mouth every day at 6 PM., Disp: , Rfl: •  Alcohol Swabs, 1 Each by Does not apply route 3 times a day., Disp: 100 Each, Rfl: 2•  Blood Glucose Monitoring Suppl Device, Meter: Dispense Device of Insurance Preference. Sig. Use as directed for blood sugar monitoring. #1. NR., Disp: 1 Device, Rfl: 0•  tamsulosin (FLOMAX) 0.4 MG capsule, Take 1 Cap by mouth ONE-HALF HOUR AFTER BREAKFAST., Disp: 90 Cap, Rfl: 0•  atenolol (TENORMIN) 50 MG Tab, Take 1 Tab by mouth 2 times a day., Disp: 180 Tab, Rfl: 0•  glucosamine 500 MG Cap, Take 1 Cap by mouth  "every day., Disp: , Rfl: •  Calcium (OYSTER-BLADE 500 PO), Take 1 Tab by mouth 2 Times a Day., Disp: , Rfl:  •  multivitamin (THERAGRAN) Tab, Take 1 Tab by mouth every day., Disp: , Rfl: •  BABY ASPIRIN PO, Take 81 mg by mouth every day., Disp: , Rfl:     Patient was instructed on the use of medications, either prescriptions or OTC and informed on when the appropriate follow up time period should be. In addition, patient was also instructed that should any acute worsening occur that they should notify this clinic asap or call 911.              Review of Systems   Constitutional: Negative.  Negative for chills and fever.   HENT: Negative.  Negative for hearing loss.    Eyes: Negative.  Negative for blurred vision and double vision.   Respiratory: Negative.  Negative for cough and hemoptysis.    Cardiovascular: Negative.  Negative for chest pain and palpitations.   Gastrointestinal: Positive for heartburn. Negative for nausea.   Genitourinary: Negative.  Negative for dysuria.   Musculoskeletal: Negative.  Negative for myalgias.   Skin: Negative.  Negative for rash.   Neurological: Negative.  Negative for dizziness, tingling and headaches.   Endo/Heme/Allergies: Negative.  Does not bruise/bleed easily.   Psychiatric/Behavioral: Negative.  Negative for depression and suicidal ideas.   All other systems reviewed and are negative.         Objective:     /98   Pulse 95   Temp 36.7 °C (98 °F)   Resp 12   Ht 1.778 m (5' 10\")   Wt 75.3 kg (166 lb)   SpO2 96%   BMI 23.82 kg/m²      Physical Exam   Constitutional: He is oriented to person, place, and time. He appears well-developed and well-nourished. No distress.   HENT:   Head: Normocephalic and atraumatic.   Mouth/Throat: Oropharynx is clear and moist. No oropharyngeal exudate.   Eyes: Pupils are equal, round, and reactive to light.   Cardiovascular: Normal rate, regular rhythm, normal heart sounds and intact distal pulses.  Exam reveals no gallop and no friction " rub.    No murmur heard.  Pulmonary/Chest: Effort normal and breath sounds normal. No respiratory distress. He has no wheezes. He has no rales. He exhibits no tenderness.   Abdominal: Soft. Bowel sounds are normal. He exhibits no distension and no mass. There is no tenderness. There is no rebound and no guarding.   Neurological: He is alert and oriented to person, place, and time.   Skin: He is not diaphoretic.   Psychiatric: He has a normal mood and affect. His behavior is normal. Judgment and thought content normal.   Nursing note and vitals reviewed.              Assessment/Plan:     1. Esophageal dysphagia    - sucralfate (CARAFATE) 1 GM Tab; Take 1 Tab by mouth 4 Times a Day,Before Meals and at Bedtime.  Dispense: 120 Tab; Refill: 3  - REFERRAL TO GASTROENTEROLOGY    2. Essential hypertension      3. Chronic hepatitis C without hepatic coma (HCC)      4. Uncontrolled type 2 diabetes mellitus with hyperglycemia (HCC)    - Blood Glucose Test Strips; Test strips order: Test strips for covered meter. Sig: use three times a day and prn ssx high or low sugar #100 RF x 3  Dispense: 100 Each; Refill: 2  - Lancets; Lancets order: Lancets for provided meter. Sig: use three times daily and prn ssx high or low sugar. #100 RF x 3  Dispense: 100 Each; Refill: 0

## 2019-07-19 ENCOUNTER — TELEPHONE (OUTPATIENT)
Dept: MEDICAL GROUP | Facility: PHYSICIAN GROUP | Age: 69
End: 2019-07-19

## 2019-07-19 NOTE — TELEPHONE ENCOUNTER
Pt called stating that he moved to [place] and he would  like a referral to a primary care there and a gi there as well.

## 2019-07-19 NOTE — TELEPHONE ENCOUNTER
Please let him know that he will not need a referral to a primary care provider there.  He should look for a medical clinic that takes his insurance and establish there.  His new primary care doctor can refer him to a GI doctor as he or she will know the doctors in the area.

## 2019-07-22 NOTE — TELEPHONE ENCOUNTER
Spoke with patient regarding message below. Patient understands and will find a primary doctor in [place]

## 2024-10-12 NOTE — TELEPHONE ENCOUNTER
The pharmacy called regarding the patients new rx on the lidocaine patches. The pharmacist states the 4% cream is not covered due to being available OTC. With 5%, that would be covered, but a new rx for the change and an completed PAR will allow the patient to have this medication.  
Initial (On Arrival)

## 2025-01-26 NOTE — TELEPHONE ENCOUNTER
Patient was seen by GI and they want a CT done. Patient is unsure if he really needs this or not. Please advise   No

## 2025-03-31 NOTE — ASSESSMENT & PLAN NOTE
Chief Complaint   Patient presents with    Consultation    Neuropathy    Office Visit     Patient would like communication of their results via:    [employer]    Cell Phone:   Telephone Information:   Mobile [phone]     Okay to leave a message containing results? Yes       He is taking metformin 850 once a day.  His hemoglobin A1c is 8.2.  He is also working on exercise and diet.